# Patient Record
Sex: MALE | Race: WHITE | Employment: UNEMPLOYED | URBAN - METROPOLITAN AREA
[De-identification: names, ages, dates, MRNs, and addresses within clinical notes are randomized per-mention and may not be internally consistent; named-entity substitution may affect disease eponyms.]

---

## 2017-10-09 ENCOUNTER — HOSPITAL ENCOUNTER (INPATIENT)
Age: 62
LOS: 2 days | Discharge: OTHER INSTITUTION | DRG: 189 | End: 2017-10-11
Attending: EMERGENCY MEDICINE | Admitting: HOSPITALIST
Payer: OTHER GOVERNMENT

## 2017-10-09 ENCOUNTER — APPOINTMENT (OUTPATIENT)
Dept: GENERAL RADIOLOGY | Age: 62
DRG: 189 | End: 2017-10-09
Attending: EMERGENCY MEDICINE
Payer: OTHER GOVERNMENT

## 2017-10-09 ENCOUNTER — APPOINTMENT (OUTPATIENT)
Dept: ULTRASOUND IMAGING | Age: 62
DRG: 189 | End: 2017-10-09
Attending: EMERGENCY MEDICINE
Payer: OTHER GOVERNMENT

## 2017-10-09 ENCOUNTER — APPOINTMENT (OUTPATIENT)
Dept: GENERAL RADIOLOGY | Age: 62
DRG: 189 | End: 2017-10-09
Attending: RADIOLOGY
Payer: OTHER GOVERNMENT

## 2017-10-09 DIAGNOSIS — C15.5 MALIGNANT NEOPLASM OF LOWER THIRD OF ESOPHAGUS (HCC): ICD-10-CM

## 2017-10-09 DIAGNOSIS — G89.3 CANCER ASSOCIATED PAIN: ICD-10-CM

## 2017-10-09 DIAGNOSIS — J44.9 COPD (CHRONIC OBSTRUCTIVE PULMONARY DISEASE) WITH CHRONIC BRONCHITIS (HCC): ICD-10-CM

## 2017-10-09 DIAGNOSIS — R06.02 SHORTNESS OF BREATH: ICD-10-CM

## 2017-10-09 PROBLEM — I25.10 CAD (CORONARY ARTERY DISEASE): Status: ACTIVE | Noted: 2017-10-09

## 2017-10-09 PROBLEM — J18.9 HEALTHCARE-ASSOCIATED PNEUMONIA: Status: ACTIVE | Noted: 2017-10-09

## 2017-10-09 PROBLEM — I10 ESSENTIAL HYPERTENSION: Status: ACTIVE | Noted: 2017-10-09

## 2017-10-09 PROBLEM — E11.69 HYPERLIPIDEMIA ASSOCIATED WITH TYPE 2 DIABETES MELLITUS (HCC): Status: ACTIVE | Noted: 2017-10-09

## 2017-10-09 PROBLEM — J18.9 PNEUMONIA: Status: ACTIVE | Noted: 2017-10-09

## 2017-10-09 PROBLEM — E11.59 TYPE 2 DIABETES MELLITUS WITH CIRCULATORY DISORDER (HCC): Status: ACTIVE | Noted: 2017-10-09

## 2017-10-09 PROBLEM — E78.5 HYPERLIPIDEMIA ASSOCIATED WITH TYPE 2 DIABETES MELLITUS (HCC): Status: ACTIVE | Noted: 2017-10-09

## 2017-10-09 PROBLEM — F17.209 TOBACCO USE DISORDER, CONTINUOUS: Status: ACTIVE | Noted: 2017-10-09

## 2017-10-09 LAB
ALBUMIN SERPL-MCNC: 1.4 G/DL (ref 3.4–5)
ALBUMIN/GLOB SERPL: 0.3 {RATIO} (ref 0.8–1.7)
ALP SERPL-CCNC: 79 U/L (ref 45–117)
ALT SERPL-CCNC: 13 U/L (ref 16–61)
ANION GAP SERPL CALC-SCNC: 3 MMOL/L (ref 3–18)
APPEARANCE FLD: ABNORMAL
ARTERIAL PATENCY WRIST A: ABNORMAL
AST SERPL-CCNC: 10 U/L (ref 15–37)
BASE EXCESS BLD CALC-SCNC: 11 MMOL/L
BASOPHILS # BLD: 0 K/UL (ref 0–0.1)
BASOPHILS NFR BLD: 0 % (ref 0–3)
BDY SITE: ABNORMAL
BILIRUB SERPL-MCNC: 0.4 MG/DL (ref 0.2–1)
BNP SERPL-MCNC: 2688 PG/ML (ref 0–900)
BODY TEMPERATURE: 98.8
BUN SERPL-MCNC: 18 MG/DL (ref 7–18)
BUN/CREAT SERPL: 41 (ref 12–20)
CALCIUM SERPL-MCNC: 8.5 MG/DL (ref 8.5–10.1)
CHLORIDE SERPL-SCNC: 95 MMOL/L (ref 100–108)
CO2 SERPL-SCNC: 33 MMOL/L (ref 21–32)
COLOR FLD: YELLOW
CREAT SERPL-MCNC: 0.44 MG/DL (ref 0.6–1.3)
DIFFERENTIAL METHOD BLD: ABNORMAL
EOSINOPHIL # BLD: 0 K/UL (ref 0–0.4)
EOSINOPHIL NFR BLD: 0 % (ref 0–5)
EOSINOPHIL NFR FLD MANUAL: 0 %
ERYTHROCYTE [DISTWIDTH] IN BLOOD BY AUTOMATED COUNT: 16.3 % (ref 11.6–14.5)
GAS FLOW.O2 O2 DELIVERY SYS: ABNORMAL L/MIN
GAS FLOW.O2 SETTING OXYMISER: 15 L/M
GLOBULIN SER CALC-MCNC: 4.8 G/DL (ref 2–4)
GLUCOSE BLD STRIP.AUTO-MCNC: 142 MG/DL (ref 70–110)
GLUCOSE SERPL-MCNC: 167 MG/DL (ref 74–99)
HCO3 BLD-SCNC: 34 MMOL/L (ref 22–26)
HCT VFR BLD AUTO: 23.4 % (ref 36–48)
HGB BLD-MCNC: 7.2 G/DL (ref 13–16)
INR PPP: 1.3 (ref 0.8–1.2)
LACTATE SERPL-SCNC: 1 MMOL/L (ref 0.4–2)
LYMPHOCYTES # BLD: 0.5 K/UL (ref 0.8–3.5)
LYMPHOCYTES NFR BLD: 8 % (ref 20–51)
LYMPHOCYTES NFR FLD: 7 %
MACROPHAGES NFR FLD: 15 %
MCH RBC QN AUTO: 21.8 PG (ref 24–34)
MCHC RBC AUTO-ENTMCNC: 30.8 G/DL (ref 31–37)
MCV RBC AUTO: 70.9 FL (ref 74–97)
MONOCYTES # BLD: 0.8 K/UL (ref 0–1)
MONOCYTES NFR BLD: 12 % (ref 2–9)
MONOCYTES NFR FLD: 5 %
NEUTROPHILS NFR FLD: 73 %
NEUTS BAND # FLD: 0 %
NEUTS BAND NFR BLD MANUAL: 10 % (ref 0–5)
NEUTS SEG # BLD: 4.6 K/UL (ref 1.8–8)
NEUTS SEG NFR BLD: 70 % (ref 42–75)
NUC CELL # FLD: 790 /CU MM
O2/TOTAL GAS SETTING VFR VENT: 1 %
PCO2 BLD: 42 MMHG (ref 35–45)
PH BLD: 7.52 [PH] (ref 7.35–7.45)
PLATELET # BLD AUTO: 399 K/UL (ref 135–420)
PMV BLD AUTO: 8.1 FL (ref 9.2–11.8)
PO2 BLD: 66 MMHG (ref 80–100)
POTASSIUM SERPL-SCNC: 3.5 MMOL/L (ref 3.5–5.5)
PROT SERPL-MCNC: 6.2 G/DL (ref 6.4–8.2)
PROTHROMBIN TIME: 15.8 SEC (ref 11.5–15.2)
RBC # BLD AUTO: 3.3 M/UL (ref 4.7–5.5)
RBC # FLD: 138 /CU MM
RBC MORPH BLD: ABNORMAL
SAO2 % BLD: 94 % (ref 92–97)
SERVICE CMNT-IMP: ABNORMAL
SODIUM SERPL-SCNC: 131 MMOL/L (ref 136–145)
SPECIMEN SOURCE FLD: ABNORMAL
SPECIMEN TYPE: ABNORMAL
TOTAL RESP. RATE, ITRR: 40
TROPONIN I SERPL-MCNC: <0.02 NG/ML (ref 0–0.06)
WBC # BLD AUTO: 6.6 K/UL (ref 4.6–13.2)
WBC MORPH BLD: ABNORMAL

## 2017-10-09 PROCEDURE — 77030016057 HC NDL HUBR APOL -B

## 2017-10-09 PROCEDURE — 84484 ASSAY OF TROPONIN QUANT: CPT | Performed by: EMERGENCY MEDICINE

## 2017-10-09 PROCEDURE — 0W993ZZ DRAINAGE OF RIGHT PLEURAL CAVITY, PERCUTANEOUS APPROACH: ICD-10-PCS | Performed by: RADIOLOGY

## 2017-10-09 PROCEDURE — 99285 EMERGENCY DEPT VISIT HI MDM: CPT

## 2017-10-09 PROCEDURE — 74011250636 HC RX REV CODE- 250/636: Performed by: PHYSICIAN ASSISTANT

## 2017-10-09 PROCEDURE — 65660000000 HC RM CCU STEPDOWN

## 2017-10-09 PROCEDURE — 71010 XR CHEST PORT: CPT

## 2017-10-09 PROCEDURE — 82962 GLUCOSE BLOOD TEST: CPT

## 2017-10-09 PROCEDURE — 96365 THER/PROPH/DIAG IV INF INIT: CPT

## 2017-10-09 PROCEDURE — 74011000258 HC RX REV CODE- 258: Performed by: EMERGENCY MEDICINE

## 2017-10-09 PROCEDURE — 77030014137 US THORACENTESIS RT NDL W IMAGE

## 2017-10-09 PROCEDURE — 89051 BODY FLUID CELL COUNT: CPT | Performed by: EMERGENCY MEDICINE

## 2017-10-09 PROCEDURE — 3E0G76Z INTRODUCTION OF NUTRITIONAL SUBSTANCE INTO UPPER GI, VIA NATURAL OR ARTIFICIAL OPENING: ICD-10-PCS | Performed by: HOSPITALIST

## 2017-10-09 PROCEDURE — 82803 BLOOD GASES ANY COMBINATION: CPT

## 2017-10-09 PROCEDURE — 76450000000

## 2017-10-09 PROCEDURE — 77030013140 HC MSK NEB VYRM -A

## 2017-10-09 PROCEDURE — 74011250636 HC RX REV CODE- 250/636: Performed by: HOSPITALIST

## 2017-10-09 PROCEDURE — 71010 XR CHEST INSP AND EXP: CPT

## 2017-10-09 PROCEDURE — 93005 ELECTROCARDIOGRAM TRACING: CPT

## 2017-10-09 PROCEDURE — 83880 ASSAY OF NATRIURETIC PEPTIDE: CPT | Performed by: EMERGENCY MEDICINE

## 2017-10-09 PROCEDURE — 94640 AIRWAY INHALATION TREATMENT: CPT

## 2017-10-09 PROCEDURE — 74011250636 HC RX REV CODE- 250/636: Performed by: EMERGENCY MEDICINE

## 2017-10-09 PROCEDURE — 83605 ASSAY OF LACTIC ACID: CPT | Performed by: EMERGENCY MEDICINE

## 2017-10-09 PROCEDURE — 36600 WITHDRAWAL OF ARTERIAL BLOOD: CPT

## 2017-10-09 PROCEDURE — 80053 COMPREHEN METABOLIC PANEL: CPT | Performed by: EMERGENCY MEDICINE

## 2017-10-09 PROCEDURE — 96361 HYDRATE IV INFUSION ADD-ON: CPT

## 2017-10-09 PROCEDURE — 85025 COMPLETE CBC W/AUTO DIFF WBC: CPT | Performed by: EMERGENCY MEDICINE

## 2017-10-09 PROCEDURE — 77030020887 HC CRM SKN PROT DIMTH S&N -A

## 2017-10-09 PROCEDURE — 77030011256 HC DRSG MEPILEX <16IN NO BORD MOLN -A

## 2017-10-09 PROCEDURE — 74011000250 HC RX REV CODE- 250: Performed by: EMERGENCY MEDICINE

## 2017-10-09 PROCEDURE — 85610 PROTHROMBIN TIME: CPT | Performed by: EMERGENCY MEDICINE

## 2017-10-09 PROCEDURE — 87040 BLOOD CULTURE FOR BACTERIA: CPT | Performed by: EMERGENCY MEDICINE

## 2017-10-09 RX ORDER — LIDOCAINE HYDROCHLORIDE 10 MG/ML
10 INJECTION, SOLUTION EPIDURAL; INFILTRATION; INTRACAUDAL; PERINEURAL
Status: COMPLETED | OUTPATIENT
Start: 2017-10-09 | End: 2017-10-09

## 2017-10-09 RX ORDER — IPRATROPIUM BROMIDE AND ALBUTEROL SULFATE 2.5; .5 MG/3ML; MG/3ML
3 SOLUTION RESPIRATORY (INHALATION)
Status: DISCONTINUED | OUTPATIENT
Start: 2017-10-09 | End: 2017-10-11 | Stop reason: HOSPADM

## 2017-10-09 RX ORDER — FENTANYL 25 UG/1
1 PATCH TRANSDERMAL
COMMUNITY

## 2017-10-09 RX ORDER — IPRATROPIUM BROMIDE AND ALBUTEROL SULFATE 2.5; .5 MG/3ML; MG/3ML
3 SOLUTION RESPIRATORY (INHALATION)
Status: COMPLETED | OUTPATIENT
Start: 2017-10-09 | End: 2017-10-09

## 2017-10-09 RX ORDER — SODIUM CHLORIDE 0.9 % (FLUSH) 0.9 %
5-10 SYRINGE (ML) INJECTION AS NEEDED
Status: DISCONTINUED | OUTPATIENT
Start: 2017-10-09 | End: 2017-10-11 | Stop reason: HOSPADM

## 2017-10-09 RX ORDER — FUROSEMIDE 10 MG/ML
INJECTION INTRAMUSCULAR; INTRAVENOUS
Status: DISPENSED
Start: 2017-10-09 | End: 2017-10-10

## 2017-10-09 RX ORDER — MORPHINE SULFATE ORAL SOLUTION 10 MG/5ML
4 SOLUTION ORAL
COMMUNITY
End: 2017-10-09

## 2017-10-09 RX ORDER — MORPHINE SULFATE 4 MG/ML
4 INJECTION, SOLUTION INTRAMUSCULAR; INTRAVENOUS ONCE
Status: COMPLETED | OUTPATIENT
Start: 2017-10-09 | End: 2017-10-09

## 2017-10-09 RX ORDER — IPRATROPIUM BROMIDE AND ALBUTEROL SULFATE 2.5; .5 MG/3ML; MG/3ML
3 SOLUTION RESPIRATORY (INHALATION) ONCE
Status: COMPLETED | OUTPATIENT
Start: 2017-10-09 | End: 2017-10-09

## 2017-10-09 RX ORDER — ENOXAPARIN SODIUM 100 MG/ML
40 INJECTION SUBCUTANEOUS EVERY 24 HOURS
Status: DISCONTINUED | OUTPATIENT
Start: 2017-10-09 | End: 2017-10-10

## 2017-10-09 RX ORDER — MORPHINE SULFATE 4 MG/ML
2 INJECTION, SOLUTION INTRAMUSCULAR; INTRAVENOUS
Status: DISCONTINUED | OUTPATIENT
Start: 2017-10-09 | End: 2017-10-10

## 2017-10-09 RX ORDER — FUROSEMIDE 10 MG/ML
40 INJECTION INTRAMUSCULAR; INTRAVENOUS ONCE
Status: COMPLETED | OUTPATIENT
Start: 2017-10-09 | End: 2017-10-09

## 2017-10-09 RX ORDER — SODIUM CHLORIDE 0.9 % (FLUSH) 0.9 %
5-10 SYRINGE (ML) INJECTION AS NEEDED
Status: DISCONTINUED | OUTPATIENT
Start: 2017-10-09 | End: 2017-10-10

## 2017-10-09 RX ORDER — ALBUTEROL SULFATE 90 UG/1
AEROSOL, METERED RESPIRATORY (INHALATION)
COMMUNITY

## 2017-10-09 RX ORDER — MORPHINE SULFATE 20 MG/ML
20 SOLUTION ORAL
COMMUNITY

## 2017-10-09 RX ORDER — ALBUTEROL SULFATE 90 UG/1
1 AEROSOL, METERED RESPIRATORY (INHALATION)
Status: DISCONTINUED | OUTPATIENT
Start: 2017-10-09 | End: 2017-10-11 | Stop reason: HOSPADM

## 2017-10-09 RX ORDER — SODIUM CHLORIDE 0.9 % (FLUSH) 0.9 %
5-10 SYRINGE (ML) INJECTION EVERY 8 HOURS
Status: DISCONTINUED | OUTPATIENT
Start: 2017-10-09 | End: 2017-10-10

## 2017-10-09 RX ORDER — LEVOFLOXACIN 5 MG/ML
750 INJECTION, SOLUTION INTRAVENOUS EVERY 24 HOURS
Status: DISCONTINUED | OUTPATIENT
Start: 2017-10-09 | End: 2017-10-10

## 2017-10-09 RX ADMIN — IPRATROPIUM BROMIDE AND ALBUTEROL SULFATE 3 ML: .5; 3 SOLUTION RESPIRATORY (INHALATION) at 13:54

## 2017-10-09 RX ADMIN — Medication 4 MG: at 17:00

## 2017-10-09 RX ADMIN — IPRATROPIUM BROMIDE AND ALBUTEROL SULFATE 3 ML: .5; 3 SOLUTION RESPIRATORY (INHALATION) at 14:03

## 2017-10-09 RX ADMIN — PIPERACILLIN SODIUM,TAZOBACTAM SODIUM 4.5 G: 4; .5 INJECTION, POWDER, FOR SOLUTION INTRAVENOUS at 21:38

## 2017-10-09 RX ADMIN — LIDOCAINE HYDROCHLORIDE 6 ML: 10 INJECTION, SOLUTION EPIDURAL; INFILTRATION; INTRACAUDAL; PERINEURAL at 15:12

## 2017-10-09 RX ADMIN — SODIUM CHLORIDE 1000 MG: 900 INJECTION, SOLUTION INTRAVENOUS at 18:45

## 2017-10-09 RX ADMIN — SODIUM CHLORIDE 1416 ML: 900 INJECTION, SOLUTION INTRAVENOUS at 13:37

## 2017-10-09 RX ADMIN — IPRATROPIUM BROMIDE AND ALBUTEROL SULFATE 3 ML: .5; 3 SOLUTION RESPIRATORY (INHALATION) at 13:55

## 2017-10-09 RX ADMIN — FUROSEMIDE 40 MG: 10 INJECTION, SOLUTION INTRAVENOUS at 19:46

## 2017-10-09 RX ADMIN — PIPERACILLIN SODIUM,TAZOBACTAM SODIUM 4.5 G: 4; .5 INJECTION, POWDER, FOR SOLUTION INTRAVENOUS at 14:27

## 2017-10-09 RX ADMIN — ENOXAPARIN SODIUM 40 MG: 40 INJECTION SUBCUTANEOUS at 17:41

## 2017-10-09 RX ADMIN — LEVOFLOXACIN 750 MG: 5 INJECTION, SOLUTION INTRAVENOUS at 15:39

## 2017-10-09 RX ADMIN — IPRATROPIUM BROMIDE AND ALBUTEROL SULFATE 3 ML: .5; 3 SOLUTION RESPIRATORY (INHALATION) at 13:26

## 2017-10-09 RX ADMIN — Medication 5 ML: at 16:44

## 2017-10-09 NOTE — ED NOTES
Pt demanding pain medication; RN rec'd order for morphine, had originally planned on holding for floor RN to evaluate pt prior to giving per Patti's request, however pt continues to demand the pain medicine, BP remains 17L systolic, will give dose now with agreement from [facility], [facility].

## 2017-10-09 NOTE — PROGRESS NOTES
Pharmacy Dosing Services: Vancomycin    Consult for Vancomycin Dosing by Pharmacy by Dr. Pepe Malcolm provided for this 62y.o. year old male , for indication of HCAP. Day of Therapy 01    Ht Readings from Last 1 Encounters:   10/09/17 162.6 cm (64\")        Wt Readings from Last 1 Encounters:   10/09/17 47.2 kg (104 lb)        Previous Regimen NA   Last Level NA   Other Current Antibiotics Zosyn 4.5 gm IV q6, Levaquin 750 mg IV q24   Significant Cultures Pending   Serum Creatinine Lab Results   Component Value Date/Time    Creatinine 0.44 10/09/2017 01:00 PM      Creatinine Clearance Estimated Creatinine Clearance: 119.2 mL/min (based on Cr of 0.44). BUN Lab Results   Component Value Date/Time    BUN 18 10/09/2017 01:00 PM      WBC Lab Results   Component Value Date/Time    WBC 6.6 10/09/2017 01:00 PM      H/H Lab Results   Component Value Date/Time    HGB 7.2 10/09/2017 01:00 PM      Platelets Lab Results   Component Value Date/Time    PLATELET 101 83/38/3726 01:00 PM      Temp 98.3 °F (36.8 °C)     Start Vancomycin therapy, with loading dose of 1 gm IV @ 17:00 [id number]  Follow with maintenance dose of 750 mg IV q8h, beginning @ 01:00 [id number]    Dose calculated to approximate a therapeutic trough of 15-20 mcg/mL. Pharmacy to follow daily and will make changes to dose and/or frequency based on clinical status. Nish Zamora Pharm. D.   Clinical Pharmacist  [phone]

## 2017-10-09 NOTE — ROUTINE PROCESS
Left sided thoracentesis performed. 950 cc yellow fluid drained. Patient tolerated procedure well. Patient given to radiology for chest x-ray in stable condition.

## 2017-10-09 NOTE — H&P
History & Physical    Patient: Nano Martin MRN: [id number]  CSN: [id number]    YOB: 1955  Age: 62 y.o. Sex: male      DOA: 10/9/2017  Primary Care Provider:  Karolina Villanueva MD      Assessment/Plan     Patient Active Problem List   Diagnosis Code    Malignant neoplasm of lower third of esophagus [facility])- 2015 C15.5    Type 2 diabetes mellitus with circulatory disorder (HCC) E11.59    CAD (coronary artery disease)- STEMI with 3 bare metal stents 2014 I25.10    Tobacco use disorder, continuous F17.209    Essential hypertension I10    COPD (chronic obstructive pulmonary disease) with chronic bronchitis (HCC) J44.9    Hyperlipidemia associated with type 2 diabetes mellitus (HCC) E11.69, E78.5    Healthcare-associated pneumonia J18.9    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 Possible PNA / SOB  2. COPD  3. Esophageal Cancer  4. HTN  5. DM2 with Circulatory Disorder  6. HLD  7. S/P PEG Tube  8. LE Edema    1. Admit under inpatient status to telemetry unit for further management. Patient on IV abx - Levaquin, Vancocin, and Zosyn. Patient afebrile, however he is tachypneic and tachycardic. qSOFA score of 2 in ED, sepsis bundle initiated in ED. CXR results are as follows:   1. Cardiomegaly with central congestion and nonspecific bilateral  mid to lower   lung coarse and interstitial markings, moderate right and tiny  left-sided effusion. Recommend clinical correlation to exclude  interstitial edema from fluid overload versus CHF.         2. Moderate length segment mid to distal metallic esophageal stent in  Place  Will order sputum culture and await results/sensitivities. Will order albuterol inhaler as patient reports taking it at home, and will add duo-neb prn for patient. Will follow patient closely and monitor vitals. 2. As above, albuterol inhaler ordered, will add duo-nebs prn.  3. Patient on hospice at home? On Fentanyl patch and morphine at home.  Patient reports just placing fentanyl patch yesterday, will not reorder upon admission. 4. No listed meds, patient has been borderline hypotensive upon admission. Receiving IVF in ED. 5. Will place patient on SSI coverage. 7. Patient reports all of his meals through PEG tube. 8. Add lasix 20mg q day    Estimated length of stay : 3-4 midnights    CC: SOB       HPI:     Ave Danielle is a 62 y.o. male who presents to the ED with a complaint of worsening shortness of breath that began a \"few days ago\". He has a significant past medical history of esophageal cancer, pleural effusions, COPD, DM2, HLD, HTN, CAD, and depression. His home meds include only an albuterol inhaler, fentanyl patch, and morphine. He was hospitalized one week ago at the [address] [place] [address] for a pleural effusion. His daughter is the primary source of information during exam, the patient is a poor historian. She reports that the home health nurse had come to the house and was taking vitals when the pulse ox was not reading properly on the patient's hand. The nurse checked to ensure the pulse ox was working properly by placing it on her hand, and it was indeed functional. At that time the home health nurse called EMS to have patient transported to ED. The patient denies any fevers, CP, N/V/D, chills, or night sweats. He only reports shortness of breath and a cough.      Past Medical History:   Diagnosis Date    CAD (coronary artery disease)- STEMI with 3 bare metal stents 2014 10/9/2017    Cancer associated pain 10/9/2017    COPD (chronic obstructive pulmonary disease) with chronic bronchitis ([address] 10/9/2017    Depression     Essential hypertension 10/9/2017    Hyperlipidemia associated with type 2 diabetes mellitus ([address] 10/9/2017    Hypertension     Malignant neoplasm of lower third of esophagus ([address] 2015    Tobacco use disorder, continuous 10/9/2017    Type 2 diabetes mellitus with circulatory disorder ([address] 10/9/2017       Past Surgical History:   Procedure Laterality Date    HX CORONARY STENT PLACEMENT  2014    bare metal x 3    HX GI      PEG tube    HX GI  2017    esophageal stent    HX PREMALIG/BENIGN SKIN LESION EXCISION  2010    pyogenic granuloma scrotum       History reviewed. No pertinent family history. Social History     Social History    Marital status:      Spouse name: N/A    Number of children: N/A    Years of education: N/A     Social History Main Topics    Smoking status: Former Smoker    Smokeless tobacco: Never Used    Alcohol use No    Drug use: None    Sexual activity: Not Asked     Other Topics Concern    None     Social History Narrative    None       Prior to Admission medications    Medication Sig Start Date End Date Taking? Authorizing Provider   fentaNYL (DURAGESIC) 25 mcg/hr PATCH 1 Patch by TransDERmal route every seventy-two (72) hours. Yes Phys MD Kay   morphine (ROXANOL) 100 mg/5 mL (20 mg/mL) concentrated solution Take 20 mg by mouth every two (2) hours as needed for Pain. Yes Historical Provider   albuterol (PROVENTIL HFA, VENTOLIN HFA, PROAIR HFA) 90 mcg/actuation inhaler Take  by inhalation. Phys MD Kay       No Known Allergies    Review of Systems  Gen: No fever, chills, malaise, weight loss/gain. Heent: No headache, rhinorrhea, epistaxis, ear pain, hearing loss, sinus pain, neck pain/stiffness, sore throat. Heart: No chest pain, palpitations, JOSEPH, pnd, or orthopnea. Resp: No hemoptysis. Reports a cough, wheezing, and shortness of breath. GI: No nausea, vomiting, diarrhea, constipation, melena or hematochezia. : No urinary obstruction, dysuria or hematuria. Derm: No rash, new skin lesion or pruritis. Musc/skeletal: no bone or joint complains. Vasc: No cyanosis or claudication. Reports new LE edema  Endo: No heat/cold intolerance, no polyuria,polydipsia or polyphagia. Neuro: No unilateral weakness, numbness, tingling. No seizures. Heme: No easy bruising or bleeding.           Physical Exam:     Physical Exam:  Visit Vitals    BP 94/75    Pulse (!) 106    Temp 98.3 °F (36.8 °C)    Resp (!) 34    Ht 5' 4\" (1.626 m)    Wt 47.2 kg (104 lb)    SpO2 95%    BMI 17.85 kg/m2    O2 Flow Rate (L/min): 3 l/min O2 Device: Nasal cannula    Temp (24hrs), Av.3 °F (36.8 °C), Min:98.3 °F (36.8 °C), Max:98.3 °F (36.8 °C)             General:  Awake, chronically ill appearing, mild distress. Head:  Normocephalic, without obvious abnormality, atraumatic. Eyes:  Conjunctivae/corneas clear, sclera anicteric, PERRL, EOMs intact. Nose: Nares normal. No drainage or sinus tenderness. Throat: Lips, mucosa, and tongue normal.    Neck: Supple, symmetrical, trachea midline, no adenopathy. Lungs:   Expiratory wheezes present, right side greater than left. Heart:  Regular rate and rhythm, S1, S2 normal, no murmur, click, rub or gallop. Abdomen: Soft, non-tender. Bowel sounds normal. No masses,  No organomegaly. Extremities: Extremities normal, atraumatic, no cyanosis. Capillary refill normal. 2+ pitting edema present bilaterally. Pulses: 2+ and symmetric all extremities. Skin: Skin color pale. No rashes or lesions   Neurologic: CNII-XII intact. No focal motor or sensory deficit. Labs Reviewed: All lab results for the last 24 hours reviewed.   Recent Results (from the past 24 hour(s))   EKG, 12 LEAD, INITIAL    Collection Time: 10/09/17 12:51 PM   Result Value Ref Range    Ventricular Rate 98 BPM    Atrial Rate 98 BPM    P-R Interval 132 ms    QRS Duration 76 ms    Q-T Interval 404 ms    QTC Calculation (Bezet) 515 ms    Calculated P Axis 62 degrees    Calculated R Axis -38 degrees    Calculated T Axis 51 degrees    Diagnosis       Normal sinus rhythm  Indeterminate axis  Low voltage QRS  Nonspecific T wave abnormality  Prolonged QT  Abnormal ECG  No previous ECGs available     CBC WITH AUTOMATED DIFF    Collection Time: 10/09/17  1:00 PM   Result Value Ref Range    WBC 6.6 4.6 - 13.2 K/uL    RBC 3.30 (L) 4.70 - 5.50 M/uL    HGB 7.2 (L) 13.0 - 16.0 g/dL    HCT 23.4 (L) 36.0 - 48.0 %    MCV 70.9 (L) 74.0 - 97.0 FL    MCH 21.8 (L) 24.0 - 34.0 PG    MCHC 30.8 (L) 31.0 - 37.0 g/dL    RDW 16.3 (H) 11.6 - 14.5 %    PLATELET 688 781 - 167 K/uL    MPV 8.1 (L) 9.2 - 11.8 FL    NEUTROPHILS 70 42 - 75 %    BAND NEUTROPHILS 10 (H) 0 - 5 %    LYMPHOCYTES 8 (L) 20 - 51 %    MONOCYTES 12 (H) 2 - 9 %    EOSINOPHILS 0 0 - 5 %    BASOPHILS 0 0 - 3 %    ABS. NEUTROPHILS 4.6 1.8 - 8.0 K/UL    ABS. LYMPHOCYTES 0.5 (L) 0.8 - 3.5 K/UL    ABS. MONOCYTES 0.8 0 - 1.0 K/UL    ABS. EOSINOPHILS 0.0 0.0 - 0.4 K/UL    ABS. BASOPHILS 0.0 0.0 - 0.1 K/UL    RBC COMMENTS ANISOCYTOSIS  1+        RBC COMMENTS MICROCYTOSIS  1+        RBC COMMENTS        POLYCHROMASIA  SLIGHT  HYPOCHROMIA  SLIGHT  POIKILOCYTOSIS  1+      RBC COMMENTS TARGET CELLS  OCCASIONAL  OVALOCYTES  FEW        DF MANUAL     TROPONIN I    Collection Time: 10/09/17  1:00 PM   Result Value Ref Range    Troponin-I, Qt. <0.02 0.00 - 0.06 NG/ML   NT-PRO BNP    Collection Time: 10/09/17  1:00 PM   Result Value Ref Range    NT pro-BNP 2688 (H) 0 - 422 PG/ML   METABOLIC PANEL, COMPREHENSIVE    Collection Time: 10/09/17  1:00 PM   Result Value Ref Range    Sodium 131 (L) 136 - 145 mmol/L    Potassium 3.5 3.5 - 5.5 mmol/L    Chloride 95 (L) 100 - 108 mmol/L    CO2 33 (H) 21 - 32 mmol/L    Anion gap 3 3.0 - 18 mmol/L    Glucose 167 (H) 74 - 99 mg/dL    BUN 18 7.0 - 18 MG/DL    Creatinine 0.44 (L) 0.6 - 1.3 MG/DL    BUN/Creatinine ratio 41 (H) 12 - 20      GFR est AA >60 >60 ml/min/1.73m2    GFR est non-AA >60 >60 ml/min/1.73m2    Calcium 8.5 8.5 - 10.1 MG/DL    Bilirubin, total 0.4 0.2 - 1.0 MG/DL    ALT (SGPT) 13 (L) 16 - 61 U/L    AST (SGOT) 10 (L) 15 - 37 U/L    Alk.  phosphatase 79 45 - 117 U/L    Protein, total 6.2 (L) 6.4 - 8.2 g/dL    Albumin 1.4 (L) 3.4 - 5.0 g/dL    Globulin 4.8 (H) 2.0 - 4.0 g/dL    A-G Ratio 0.3 (L) 0.8 - 1.7     LACTIC ACID    Collection Time: 10/09/17  1:00 PM   Result Value Ref Range    Lactic acid 1.0 0.4 - 2.0 MMOL/L   PROTHROMBIN TIME + INR    Collection Time: 10/09/17  1:00 PM   Result Value Ref Range    Prothrombin time 15.8 (H) 11.5 - 15.2 sec    INR 1.3 (H) 0.8 - 1.2         Procedures/imaging: see electronic medical records for all procedures/Xrays and details which were not copied into this note but were reviewed prior to creation of Plan      CC: Karolina Villanueva MD

## 2017-10-09 NOTE — PROGRESS NOTES
Admission Medication Reconciliation has been performed on this ED patient consisting of interview of the patient regarding their PTA Home Medication List, Allergies and PMH as well as obtaining outpatient pharmacy information. Interviewed patient' s daughter who pulled up rx info on [address] website. Pt is barely coherent and a poor historian at this time. [facility]  Smoking status is up to 1 ppd  Alcohol use denies  Illicit drug use denies  Patient ABX use within the past 30 days = Augmentin suspension  Has patient received any antineoplastics in the past 30 days? No, last chemo was in July  Has patient received any radiation treatments in the past 45 days?  na      Medication Reconciliation Interventions:   Wrong Medication Identified 1  Wrong/missing medication strength or dose identified  0  Wrong/missing Interval Identified 0  Wrong/missing Route Identified 0  Medication Duplication 0  Omissions 0  Commissions 0  Other Issue(s) Identified (Indicate): 0            Medication Compliance Issues and/or Medication Concerns: 0    Milton Taveras [id number] Pharmacist  [phone]

## 2017-10-09 NOTE — Clinical Note
Status[de-identified] Inpatient [101] Type of Bed: Telemetry [19] Inpatient Hospitalization Certified Necessary for the Following Reasons: 3. Patient receiving treatment that can only be provided in an inpatient setting (further clarification in H&P documentation) Admitting Diagnosis: Healthcare-associated pneumonia [225048] Admitting Physician: Cain Tolbert [[id number]] Attending Physician: Cain Tolbert [[id number]] Estimated Length of Stay: 2 Midnights Discharge Plan[de-identified] Home with Office Follow-up

## 2017-10-09 NOTE — ED TRIAGE NOTES
Presents today via EMS c/o shortness of breath, worsening over last few days. Hx: esophageal cancer, states was recently admitted to [facility] and had fluid on lungs pulled off about 2 weeks ago. Sepsis Screening completed    (  x)Patient meets SIRS criteria. (  )Patient does not meet SIRS criteria.       SIRS Criteria is achieved when two or more of the following are present   Temperature < 96.8°F (36°C) or > 100.9°F (38.3°C)   Heart Rate > 90 beats per minute   Respiratory Rate > 20 breaths per minute   WBC count > 12,000 or <4,000 or > 10% bands

## 2017-10-09 NOTE — PALLIATIVE CARE
Palliative medicine consult noted and appreciated. PM team members, Rachael Sofia RN and Dr. Jeri Harada met briefly with Mr. Mary Callejas in ED. He was asleep when we entered his room but roused easily. US personnell came to take Mr. Mary Callejas for a procedure so we were unable to do more than introduce ourselves and suggest a meeting for tomorrow when his daughter would be available. Mr. Mary Callejas agreed with this and provided his daughter's name and contact information. Call placed to Blair Holbrook pt's daughter, [phone]. She stated that she and her sister are both Medical Power of  and that she will provide this document as well as Mr. Lawanda Koyanagi Living Will. She states he does NOT have a DDNR but she believes that is consistent with his wishes as stated in his Living Will. Thor Anna was interested in speaking with PM team but wants to verify when her sister may be available as they make all decisions together as a family. Contact information provided. She will call back as soon as she is able to speak with her sister. Full consult note to follow family meeting.      Rachael Sofia RN

## 2017-10-09 NOTE — PROGRESS NOTES

## 2017-10-09 NOTE — ED NOTES
Palliative care down to speak with pt; left card for daughter and will set up to speak with her and pt tomorrow. Pt to IR at this time for US thoracentesis. Remains on O2 @ 3L/min. IV fluids continue to infuse.

## 2017-10-09 NOTE — ROUTINE PROCESS
TRANSFER - IN REPORT:    Verbal report received from GONZALO GRIFFIN [facility]) on Char Laguerre  being received from ED(unit) for routine progression of care      Report consisted of patients Situation, Background, Assessment and   Recommendations(SBAR). Information from the following report(s) SBAR, Kardex, ED Summary, Procedure Summary, Intake/Output, MAR, Recent Results and Cardiac Rhythm ST was reviewed with the receiving nurse. Opportunity for questions and clarification was provided. Assessment completed upon patients arrival to unit and care assumed.

## 2017-10-09 NOTE — ED PROVIDER NOTES
[address]  EMERGENCY DEPARTMENT HISTORY AND PHYSICAL EXAM       Date: 10/9/2017   Patient Name: Raghu Olguin   YOB: 1955  Medical Record Number: [id number]    History of Presenting Illness     Chief Complaint   Patient presents with    Shortness of Breath        History Provided By:  Patient, EMS    Additional History: 12:44 PM  Raghu Olguin is a 62 y.o. male PMHx of COPD who presents to the emergency department via EMS from hospice C/O worsening SOB onset a \"few days ago\". Associated symptoms include bilateral leg swelling. Health care nurse called EMS due to low O2 sats. Multiple episodes of choking. Productive couhg. Recurrent pna for 6 months. Recent inpatient pna treatment. Finished course of abx last week. SOB. Similar SOB in past that improved with fluid removal at [facility].  Worse with exertion. Pmhx of esophageal cancer and pleural effusion. Last hospitalized at the [place] one week ago for pleural effusion. Pt rx Fentanyl patch and Morphine. Pt denies any other Sx or complaints.        Primary Care Provider: Karolina Villanueva MD   Specialist:    Past History     Past Medical History:   Past Medical History:   Diagnosis Date    CAD (coronary artery disease)- STEMI with 3 bare metal stents 2014 10/9/2017    Cancer associated pain 10/9/2017    COPD (chronic obstructive pulmonary disease) with chronic bronchitis ([address] 10/9/2017    Depression     Essential hypertension 10/9/2017    Hyperlipidemia associated with type 2 diabetes mellitus ([address] 10/9/2017    Hypertension     Malignant neoplasm of lower third of esophagus ([address] 2015    Tobacco use disorder, continuous 10/9/2017    Type 2 diabetes mellitus with circulatory disorder ([address] 10/9/2017        Past Surgical History:   Past Surgical History:   Procedure Laterality Date    HX CORONARY STENT PLACEMENT  2014    bare metal x 3    HX GI      PEG tube    HX GI  2017    esophageal stent    HX PREMALIG/BENIGN SKIN LESION EXCISION  2010    pyogenic granuloma scrotum        Family History:   History reviewed. No pertinent family history. Social History:   Social History   Substance Use Topics    Smoking status: Former Smoker    Smokeless tobacco: Never Used    Alcohol use No        Allergies:   No Known Allergies     Review of Systems   Review of Systems   Respiratory: Positive for cough, choking and shortness of breath. Cardiovascular: Negative for leg swelling. Musculoskeletal: Negative for neck pain. Neurological: Negative for headaches. Psychiatric/Behavioral: Negative for confusion. All other systems reviewed and are negative. Physical Exam  Vitals:    10/10/17 0805 10/10/17 0828 10/10/17 0906 10/10/17 1106   BP: (!) 78/47  (!) 68/52 (!) 78/51   Pulse: 86  92 87   Resp: 27  (!) 36 28   Temp: 97.6 °F (36.4 °C)   97.8 °F (36.6 °C)   SpO2: 100% 97%  99%   Weight:       Height:           Physical Exam  Vital signs and nursing notes reviewed    CONSTITUTIONAL: Alert, in no apparent distress; well-developed; muscle wastin  HEAD:  Normocephalic, atraumatic  EYES: PERRL; EOM's intact. ENTM: Nose: no rhinorrhea; Throat: no erythema or exudate, mucous membranes moist  Neck:  No JVD, supple without lymphadenopathy  RESP: Expiratory wheezing, right greater than left   CV: S1 and S2 WNL; No murmurs, gallops or rubs. GI: Normal bowel sounds, abdomen soft and non-tender. No masses or organomegaly. : No costo-vertebral angle tenderness. BACK:  Non-tender  UPPER EXT:  Normal inspection  LOWER EXT: No edema, no calf tenderness. Distal pulses intact. NEURO: CN intact, reflexes 2/4 and sym, strength 5/5 and sym, sensation intact. SKIN: No rashes;   PSYCH:  Alert and oriented, normal affect.       Diagnostic Study Results     Labs -      Recent Results (from the past 12 hour(s))   GLUCOSE, POC    Collection Time: 10/10/17 11:22 AM   Result Value Ref Range    Glucose (POC) 122 (H) 70 - 110 mg/dL       Radiologic Studies -  The following have been ordered and reviewed:  XR CHEST PORT   Final Result   IMPRESSION:     1. Cardiomegaly with central congestion and nonspecific bilateral mid to lower  lung coarse and interstitial markings, moderate right and tiny left-sided  effusion. Recommend clinical correlation to exclude interstitial edema from  fluid overload versus CHF.        2. Moderate length segment mid to distal metallic esophageal stent in place. As read by the radiologist.    Murray     (Results Pending)           Medical Decision Making   I am the first provider for this patient. I reviewed the vital signs, available nursing notes, past medical history, past surgical history, family history and social history. Vital Signs-Reviewed the patient's vital signs. Patient Vitals for the past 12 hrs:   Temp Pulse Resp BP SpO2   10/10/17 1106 97.8 °F (36.6 °C) 87 28 (!) 78/51 99 %   10/10/17 0906 - 92 (!) 36 (!) 68/52 -   10/10/17 0828 - - - - 97 %   10/10/17 0805 97.6 °F (36.4 °C) 86 27 (!) 78/47 100 %   10/10/17 0758 - 92 24 (!) 72/50 -       Pulse Oximetry Analysis - Abnormal 89% on room air     Cardiac Monitor:   Rate: 97 bpm  Rhythm: Normal Sinus Rhythm      EKG interpretation: (Preliminary)  Rhythm: Normal Sinus Rhythm. Non-specific ST changes. No STEMI . Rate (approx.): 98 bpm; CA interval 132 ms, QRS 76 ms, QTC/QTc 515 ms  EKG read by Bert Ta MD at 12:51 PM    Old Medical Records: Old medical records. Previous electrocardiograms. Nursing notes. Provider Notes:     INITIAL CLINICAL IMPRESSION and PLANS:  The patient presents with the primary complaint(s) of: SOB. The presentation, to include historical aspects and clinical findings are consistent with the DX of COPD exacerbation. However, other possible DX's to consider as primary, associated with, or exacerbated by include:    1. CHF  2. PNA   3.   Recurrent plueral effusion       Considering the above, my initial management plan to evaluate and therapeutic interventions include the following and as noted in the orders:    1. Labs: lactic acid, blood culture, CMP, NT-Pro BNP, Troponin I, CBC with DIff, PTT + INR  2. Imaging: CXR, EKG      Procedures:   Procedures    ED Course:  12:44 PM  Initial assessment performed. The patients presenting problems have been discussed, and they are in agreement with the care plan formulated and outlined with them. I have encouraged them to ask questions as they arise throughout their visit. 1:36 PM Discussed patient's history, exam, and available diagnostics results with interventional radiology who will evaluate patient for thoracentesis. 1:50 PM Daughter at bedside now. States that she has an advance directive at home. She will bring it to the hospital. PET scan scheduled on October 24th, 2017. Patient not on home hospice at this time. 2:44 PM Palliative care is in the room. 3:00 PM Discussed patient's history, exam, and available diagnostics results with Jackie Azul MD, internal medicine, who agree to admit patient to telemetry. 3:03 PM  Patient is being admitted to the hospital by Jackie Azul MD. The results of their tests and reasons for their admission have been discussed with them and/or available family. They convey agreement and understanding for the need to be admitted and for their admission diagnosis. CONDITIONS ON ADMISSION:  Deep Vein Thrombosis is not present at the time of admission. Thrombosis is not present at the time of admission. Urinary Tract Infection is not present at the time of admission. Pneumonia is not present at the time of admission. MRSA is not present at the time of admission. Wound infection is not present at the time of admission. Pressure Ulcer is not present at the time of admission. Sepsis bundle initiated covering for hospital acquired pna. Palliative care consult.   Daughter states patient with request for DNR in prior conversations with father. She states she in the medical POA. Mild respiratory distress. Thoracentesis by IR. Muscle wasting. Lactate wnl. IVF given. Daughter comfortable with plan.   Admit to hospital.      Medications Given in the ED:  Medications   sodium chloride (NS) flush 5-10 mL (not administered)   albuterol (PROVENTIL HFA, VENTOLIN HFA, PROAIR HFA) inhaler 1 Puff (not administered)   albuterol-ipratropium (DUO-NEB) 2.5 MG-0.5 MG/3 ML (not administered)   furosemide (LASIX) 10 mg/mL injection (not administered)   morphine IR (MS IR) tablet 15 mg (15 mg Per G Tube Given 10/10/17 1844)   haloperidol (HALDOL) 2 mg/mL oral solution 2 mg (not administered)     Or   haloperidol lactate (HALDOL) injection 2 mg (not administered)   ondansetron (ZOFRAN ODT) tablet 4 mg (not administered)   LORazepam (INTENSOL) 2 mg/mL oral concentrate 1 mg (not administered)   acetaminophen (TYLENOL) tablet 650 mg (not administered)     Or   acetaminophen (TYLENOL) solution 650 mg (not administered)     Or   acetaminophen (TYLENOL) suppository 650 mg (not administered)   albuterol-ipratropium (DUO-NEB) 2.5 MG-0.5 MG/3 ML (3 mL Nebulization Given 10/9/17 1403)   sodium chloride 0.9 % bolus infusion 1,416 mL (0 mL/kg × 47.2 kg IntraVENous IV Completed 10/9/17 1631)   vancomycin (VANCOCIN) 1,000 mg in 0.9% sodium chloride (MBP/ADV) 250 mL adv (1,000 mg IntraVENous New Bag 10/9/17 1845)   albuterol-ipratropium (DUO-NEB) 2.5 MG-0.5 MG/3 ML (3 mL Nebulization Given 10/9/17 1354)   lidocaine (PF) (XYLOCAINE) 10 mg/mL (1 %) injection 10 mL (6 mL SubCUTAneous Given 10/9/17 1512)   morphine injection 4 mg (4 mg IntraVENous Given 10/9/17 1700)   furosemide (LASIX) injection 40 mg (40 mg IntraVENous Given 10/9/17 1946)   sodium chloride 0.9 % bolus infusion 500 mL (500 mL IntraVENous New Bag 10/10/17 5684)   sodium chloride 0.9 % bolus infusion 1,000 mL (1,000 mL IntraVENous New Bag 10/10/17 7519)         Diagnosis Clinical Impression:   1. Shortness of breath    2. Cancer associated pain    3. Malignant neoplasm of lower third of esophagus (HCC)    4. COPD (chronic obstructive pulmonary disease) with chronic bronchitis ([address]    5. Healthcare associated pneumonia  6. Pleural effusion  7. Sepsis   _______________________________   Attestations: This note is prepared by Adelfo Alejo and Masoud Banuelos, acting as a Scribe for Emili Soliz MD on 12:44 PM on 10/9/2017 . Emili Soliz MD: The scribe's documentation has been prepared under my direction and personally reviewed by me in its entirety.   _______________________________

## 2017-10-09 NOTE — ED NOTES
Pt returned from thoracentesis; continues to be tachypneic with shallow respirations. States \"feels a little easier\".

## 2017-10-09 NOTE — ED NOTES
Pt continues to be tachypneic however coarseness has decreased throughout lungs. +diminished throughout R>L.

## 2017-10-09 NOTE — BRIEF OP NOTE
BRIEF OPERATIVE NOTE    Status Post Right sided U/S guided Thoracentesis. Removed 950 cc of clear yellow fluid with specimen submitted to lab per requesting clinician. No immediate complications. To have post procedure CXR.     ..      Karl Mensah MD  Vascular & Interventional Radiology  [facility]  10/9/2017

## 2017-10-09 NOTE — PROGRESS NOTES
0 Pt arrived on the unit via stretcher accompanied by family members. Pt is alert and oriented x4, ST on the monitor, VSS. Pt is tachypneic and dyspneic with exertion; 94% on 3L nc. Lung sounds are coarse with crackles in the lower lobes. Dressing from right-sided thoracentesis is CD&I. Pt has been oriented to the room, call bell has been left within reach, and pt denies any further needs at this time. Assessment performed and charted. 1904 Paged hospitalist to verify rate for tube feedings. 1910 Spoke to Dr. Surya Corral and orders were received to start tube feedings at 10ml/hr and to increase rate by 10ml every hour until goal of 40ml is reached. Orders also received to add 100ml of normal saline flushes q4 hours as well. Orders read back and verified. 1930 Pt was dyspneic at rest using accessory muscles. sp02 80% on 4l nc. Placed pt on non-rebreather mask. O2 saturations increased to 89%. Oncoming nurse paging MD for further orders. Shift Summary- Pt was seen and evaluated by RT and is now 98% on hiflow 02. abg's were drawn, and 40mg of IV lasix given. Pt is resting comfortably in bed. Bedside and Verbal shift change report given to JARRELL Garnett Mai (oncoming nurse) by Jacklyn Atkins   (offgoing nurse).  Report included the following information SBAR, Kardex, ED Summary, Procedure Summary, Intake/Output, MAR, Recent Results and Cardiac Rhythm ST.

## 2017-10-09 NOTE — ED NOTES
Pt requesting pain medication states takes morphine every four hours; has not had any since 0900 today. Hospital PA is at bedside speaking with pt at this time.

## 2017-10-10 PROBLEM — D64.9 ANEMIA: Status: ACTIVE | Noted: 2017-10-10

## 2017-10-10 PROBLEM — E43 PROTEIN-CALORIE MALNUTRITION, SEVERE (HCC): Status: ACTIVE | Noted: 2017-10-10

## 2017-10-10 PROBLEM — J96.00 ACUTE RESPIRATORY FAILURE (HCC): Status: ACTIVE | Noted: 2017-10-10

## 2017-10-10 PROBLEM — I95.9 HYPOTENSION: Status: ACTIVE | Noted: 2017-10-10

## 2017-10-10 LAB
ANION GAP SERPL CALC-SCNC: 4 MMOL/L (ref 3–18)
BASOPHILS # BLD: 0 K/UL (ref 0–0.1)
BASOPHILS NFR BLD: 0 % (ref 0–3)
BUN SERPL-MCNC: 15 MG/DL (ref 7–18)
BUN/CREAT SERPL: 22 (ref 12–20)
CALCIUM SERPL-MCNC: 7.8 MG/DL (ref 8.5–10.1)
CHLORIDE SERPL-SCNC: 100 MMOL/L (ref 100–108)
CO2 SERPL-SCNC: 31 MMOL/L (ref 21–32)
CREAT SERPL-MCNC: 0.67 MG/DL (ref 0.6–1.3)
DIFFERENTIAL METHOD BLD: ABNORMAL
EOSINOPHIL # BLD: 0 K/UL (ref 0–0.4)
EOSINOPHIL NFR BLD: 0 % (ref 0–5)
ERYTHROCYTE [DISTWIDTH] IN BLOOD BY AUTOMATED COUNT: 16.3 % (ref 11.6–14.5)
EST. AVERAGE GLUCOSE BLD GHB EST-MCNC: 200 MG/DL
GLUCOSE BLD STRIP.AUTO-MCNC: 122 MG/DL (ref 70–110)
GLUCOSE BLD STRIP.AUTO-MCNC: 132 MG/DL (ref 70–110)
GLUCOSE SERPL-MCNC: 110 MG/DL (ref 74–99)
HBA1C MFR BLD: 8.6 % (ref 4.5–5.6)
HCT VFR BLD AUTO: 21.8 % (ref 36–48)
HGB BLD-MCNC: 6.8 G/DL (ref 13–16)
LYMPHOCYTES # BLD: 0.2 K/UL (ref 0.8–3.5)
LYMPHOCYTES NFR BLD: 3 % (ref 20–51)
MCH RBC QN AUTO: 22.1 PG (ref 24–34)
MCHC RBC AUTO-ENTMCNC: 31.2 G/DL (ref 31–37)
MCV RBC AUTO: 71 FL (ref 74–97)
MONOCYTES # BLD: 0.2 K/UL (ref 0–1)
MONOCYTES NFR BLD: 3 % (ref 2–9)
NEUTS BAND NFR BLD MANUAL: 31 % (ref 0–5)
NEUTS SEG # BLD: 3.5 K/UL (ref 1.8–8)
NEUTS SEG NFR BLD: 63 % (ref 42–75)
PLATELET # BLD AUTO: 323 K/UL (ref 135–420)
PMV BLD AUTO: 8.1 FL (ref 9.2–11.8)
POTASSIUM SERPL-SCNC: 3.3 MMOL/L (ref 3.5–5.5)
RBC # BLD AUTO: 3.07 M/UL (ref 4.7–5.5)
RBC MORPH BLD: ABNORMAL
SODIUM SERPL-SCNC: 135 MMOL/L (ref 136–145)
WBC # BLD AUTO: 5.5 K/UL (ref 4.6–13.2)

## 2017-10-10 PROCEDURE — 74011250636 HC RX REV CODE- 250/636: Performed by: EMERGENCY MEDICINE

## 2017-10-10 PROCEDURE — 36415 COLL VENOUS BLD VENIPUNCTURE: CPT | Performed by: HOSPITALIST

## 2017-10-10 PROCEDURE — 82962 GLUCOSE BLOOD TEST: CPT

## 2017-10-10 PROCEDURE — 83036 HEMOGLOBIN GLYCOSYLATED A1C: CPT | Performed by: HOSPITALIST

## 2017-10-10 PROCEDURE — 74011000258 HC RX REV CODE- 258: Performed by: EMERGENCY MEDICINE

## 2017-10-10 PROCEDURE — 86920 COMPATIBILITY TEST SPIN: CPT | Performed by: HOSPITALIST

## 2017-10-10 PROCEDURE — 80048 BASIC METABOLIC PNL TOTAL CA: CPT | Performed by: PHYSICIAN ASSISTANT

## 2017-10-10 PROCEDURE — 74011250637 HC RX REV CODE- 250/637: Performed by: FAMILY MEDICINE

## 2017-10-10 PROCEDURE — 65660000000 HC RM CCU STEPDOWN

## 2017-10-10 PROCEDURE — 74011250636 HC RX REV CODE- 250/636: Performed by: HOSPITALIST

## 2017-10-10 PROCEDURE — 86900 BLOOD TYPING SEROLOGIC ABO: CPT | Performed by: HOSPITALIST

## 2017-10-10 PROCEDURE — 85025 COMPLETE CBC W/AUTO DIFF WBC: CPT | Performed by: PHYSICIAN ASSISTANT

## 2017-10-10 PROCEDURE — 77010033711 HC HIGH FLOW OXYGEN

## 2017-10-10 RX ORDER — INSULIN LISPRO 100 [IU]/ML
INJECTION, SOLUTION INTRAVENOUS; SUBCUTANEOUS
Status: DISCONTINUED | OUTPATIENT
Start: 2017-10-10 | End: 2017-10-10

## 2017-10-10 RX ORDER — ONDANSETRON 4 MG/1
4 TABLET, ORALLY DISINTEGRATING ORAL
Status: DISCONTINUED | OUTPATIENT
Start: 2017-10-10 | End: 2017-10-11 | Stop reason: HOSPADM

## 2017-10-10 RX ORDER — LORAZEPAM 2 MG/ML
1 CONCENTRATE ORAL
Status: DISCONTINUED | OUTPATIENT
Start: 2017-10-10 | End: 2017-10-11 | Stop reason: HOSPADM

## 2017-10-10 RX ORDER — MORPHINE SULFATE 15 MG/1
15 TABLET ORAL
Status: DISCONTINUED | OUTPATIENT
Start: 2017-10-10 | End: 2017-10-11

## 2017-10-10 RX ORDER — DEXTROSE 50 % IN WATER (D50W) INTRAVENOUS SYRINGE
25-50 AS NEEDED
Status: DISCONTINUED | OUTPATIENT
Start: 2017-10-10 | End: 2017-10-10

## 2017-10-10 RX ORDER — ACETAMINOPHEN 650 MG/1
650 SUPPOSITORY RECTAL
Status: DISCONTINUED | OUTPATIENT
Start: 2017-10-10 | End: 2017-10-11 | Stop reason: HOSPADM

## 2017-10-10 RX ORDER — HALOPERIDOL 5 MG/ML
2 INJECTION INTRAMUSCULAR
Status: DISCONTINUED | OUTPATIENT
Start: 2017-10-10 | End: 2017-10-11 | Stop reason: HOSPADM

## 2017-10-10 RX ORDER — MORPHINE SULFATE 15 MG/1
15 TABLET ORAL
Status: DISCONTINUED | OUTPATIENT
Start: 2017-10-10 | End: 2017-10-10

## 2017-10-10 RX ORDER — SODIUM CHLORIDE 9 MG/ML
250 INJECTION, SOLUTION INTRAVENOUS AS NEEDED
Status: DISCONTINUED | OUTPATIENT
Start: 2017-10-10 | End: 2017-10-11 | Stop reason: HOSPADM

## 2017-10-10 RX ORDER — ACETAMINOPHEN 325 MG/1
650 TABLET ORAL
Status: DISCONTINUED | OUTPATIENT
Start: 2017-10-10 | End: 2017-10-11 | Stop reason: HOSPADM

## 2017-10-10 RX ORDER — HALOPERIDOL 2 MG/ML
2 SOLUTION ORAL
Status: DISCONTINUED | OUTPATIENT
Start: 2017-10-10 | End: 2017-10-11 | Stop reason: HOSPADM

## 2017-10-10 RX ORDER — MAGNESIUM SULFATE 100 %
4 CRYSTALS MISCELLANEOUS AS NEEDED
Status: DISCONTINUED | OUTPATIENT
Start: 2017-10-10 | End: 2017-10-10

## 2017-10-10 RX ADMIN — PIPERACILLIN SODIUM,TAZOBACTAM SODIUM 4.5 G: 4; .5 INJECTION, POWDER, FOR SOLUTION INTRAVENOUS at 06:53

## 2017-10-10 RX ADMIN — PIPERACILLIN SODIUM,TAZOBACTAM SODIUM 4.5 G: 4; .5 INJECTION, POWDER, FOR SOLUTION INTRAVENOUS at 02:38

## 2017-10-10 RX ADMIN — MORPHINE SULFATE 15 MG: 15 TABLET ORAL at 15:36

## 2017-10-10 RX ADMIN — MORPHINE SULFATE 15 MG: 15 TABLET ORAL at 21:29

## 2017-10-10 RX ADMIN — MORPHINE SULFATE 15 MG: 15 TABLET ORAL at 18:44

## 2017-10-10 RX ADMIN — MORPHINE SULFATE 15 MG: 15 TABLET ORAL at 12:39

## 2017-10-10 RX ADMIN — SODIUM CHLORIDE 1000 ML: 900 INJECTION, SOLUTION INTRAVENOUS at 10:23

## 2017-10-10 RX ADMIN — MORPHINE SULFATE 15 MG: 15 TABLET ORAL at 23:20

## 2017-10-10 RX ADMIN — MORPHINE SULFATE 15 MG: 15 TABLET ORAL at 10:24

## 2017-10-10 RX ADMIN — MORPHINE SULFATE 15 MG: 15 TABLET ORAL at 13:59

## 2017-10-10 RX ADMIN — SODIUM CHLORIDE 500 ML: 900 INJECTION, SOLUTION INTRAVENOUS at 08:28

## 2017-10-10 RX ADMIN — SODIUM CHLORIDE 750 MG: 900 INJECTION, SOLUTION INTRAVENOUS at 03:19

## 2017-10-10 RX ADMIN — MORPHINE SULFATE 15 MG: 15 TABLET ORAL at 20:13

## 2017-10-10 NOTE — ROUTINE PROCESS
9321:  Paging Dr. Julia Li. Patient's blood pressure 68/52 after 500 ml sodium chloride bolus. Patient states he wants to be a DNR and wants his pain medication. RR was called, Dr. Castillo Hooks to bedside. No new orders. Patient is alert and asymptomatic. ICU RN Luiza Bourgeois at bedside. Patient does have [id number] at bedside that does state patient does not want any life saving measures.

## 2017-10-10 NOTE — PROGRESS NOTES
8278  RRT called    0920 RRT called for low BP. Per [facility] RN pt received bolus of fluid. BP remain SBP 68/52, resp 32 on hi-flow HR 92, Dr Karyn Schirmer and Mark at bedside. Pt sating I want pain medication and to be a DNR. Dr Karyn Schirmer verified this with the patient. Informed to call family and Dr Hubert Flowers to discuss code status. Mgon showed documentation from [facility], designating pt wishes and daughter as decision  Maker. Faye Peters placed call to Dr Hubert Flowers. 7956  Reviewed pallaitive note, called team to notify of paperwork in had.

## 2017-10-10 NOTE — PROGRESS NOTES
Speech Therapy Screening:  Services are not indicated at this time. An InBasket screening referral was triggered for speech therapy based on results obtained during the nursing admission assessment. The patients chart was reviewed and the patient is not appropriate for a skilled therapy evaluation at this time due to: long-term use of TF for nutrition and meds per CATALINO Jamison 2/2 esophageal CA. Hospice following the patient. However, please consult speech therapy if any therapy needs arise. Thank you.     MARCOS MartinS.Devon., CCC/SLP  Speech Language Pathologist

## 2017-10-10 NOTE — PROGRESS NOTES
Readmission Risk Assessment:     Moderate Risk and MSSP/Good Help ACO patients    RRAT Score:  13-20    Initial Assessment: presents to the emergency department via EMS from hospice C/O worsening SOB onset a \"few days ago      Emergency Contact:  See face sheet    Pertinent Medical Hx:    COPD, esophageal Cancer, CAD, HTN,      [facility]      Community Services:       DME:          Moderate Risk Care Transition Plan:  1. Evaluate for [facility] or [facility], SNF, acute rehab, community care coordination of resources. 2. Involve patient/caregiver in assessment, planning, education and implement of intervention. 3. CM daily patient care huddles/interdisciplinary rounds. 4. PCP/Specialist appointment within 5  7 days made prior to discharge. 5. Facilitate transportation and logistics for follow-up appointments. 6. Medication reconciliation [id number]  7. Formal handoff between hospital provider and post-acute provider to transition patient  Handoff to [facility] Nurse Navigator or PCP practice. Chart Reviewed. Noted patient admitted to the hospital for further medical management. Care Management to follow up with patient and/or family for transition of care needs prn.

## 2017-10-10 NOTE — CDMP QUERY
Please clarify if this patient is being treated/managed for:    => Cardiogenic, Septic, or Hypovolemic shock as evidenced by severe hypotension requiring fluid boluses  =>Other Explanation of clinical findings  =>Unable to Determine (no explanation of clinical findings)    The medical record reflects the following:    Risk: Esophageal CA, COPD, HTN, PNA    Clinical Indicators Treatment: Patient presented w/ worsening SOB. He had a fentanyl patch in place for esophageal ca pain. His BP on arrival was 85/48, P99, MAP 57-60. WBC's 6.6 70% neuts, 10% bands. He was given a fluid bolus in ER 1500cc and BP improved to 93/63. The patient was restarted on tube feeds. His BP again dropped overnight to 80's/40's. Pain medication was held due to hypotension. He was given NS bolus 500cc, however BP continued to drop. H/H on admission = 7.2/23.4, dropped to 6.8/21.8 on 10/10. BP currently 68/52, MAP 57 after 500cc bolus. He is also being treated w/ IV vanco, zosyn, and levaquin. Awaiting Palliative care family meeting for further treatment goals. Please clarify and document your clinical opinion in the progress notes and discharge summary including the definitive and/or presumptive diagnosis, (suspected or probable), related to the above clinical findings. Please include clinical findings supporting your diagnosis. If you DECLINE this query or would like to communicate with [facility], please utilize the \"[employer] message box\" at the TOP of the Progress Note on the right.       Thank you,  Marni Pump [facility], [address] [place] [address] Ne

## 2017-10-10 NOTE — PROGRESS NOTES
D/c plan not finalized at this time    Met with patient and palliative care team and IDR team. Was informed patient would like to be on comfort care. However, if he is stable [facility]. was informed

## 2017-10-10 NOTE — ROUTINE PROCESS
26:  Assumed care for patient, received bedside report from Mary Hernandez RN. Patient stated that he did not feel good and \"want pain medicine. \"  Manual vitals taken, BP 72/50 on left arm, P92, R24.      0800:  Dr Marcell Reddy paged in regard to BP.    2671:  Provider assignment updated, Dr. Stephen Stephenson paged in regards to BP.    0816:  Spoke to Dr Stephen Stephenson and ching order of 500ml bolus with readback.     1856:  Shift summary:  See above note

## 2017-10-10 NOTE — CDMP QUERY
Please clarify if this patient is being treated/managed for:    => Acute Hypoxic Respiratory Failure requiring Hi flow O2  =>Other Explanation of clinical findings  =>Unable to Determine (no explanation of clinical findings)    The medical record reflects the following:    Risk: Esophageal CA, HTN, COPD    Clinical Indicators: Patient presented w/ worsening SOB. Had thoracentesis 2 weeks ago. CXR showed moderate R sided effusion. RR 30-40, O2 sat 87% on RA. Placed on NRB mask and transitioned to Hi Flow O2. ABG's performed on 15L NRB mask:  7.517/42/66/34.0/94%    Treatment: Thoracentesis w/ 950cc clear yellow fluid obtained. NRB mask --> Hi Flow O2 @ 35L/min    Please clarify and document your clinical opinion in the progress notes and discharge summary including the definitive and/or presumptive diagnosis, (suspected or probable), related to the above clinical findings. Please include clinical findings supporting your diagnosis. If you DECLINE this query or would like to communicate with [facility], please utilize the \"[facility] message box\" at the TOP of the Progress Note on the right.       Thank you,  Alistair Lockett [address] [place] [address], [address] [place] [address] Ne

## 2017-10-10 NOTE — PROGRESS NOTES
NUTRITION update    ASSESSMENT/PLAN:     Current Diet: NPO    Chart reviewed, note change in goals of care now focused on comfort measures only. Aggressive nutrition intervention is not indicated at this time. Will assist as needed; please consult if nutrition intervention is medically indicated and consistent with patient's goals of care.       Prabhu Aguilera RD  Pager:  [phone]

## 2017-10-10 NOTE — CONSULTS
[facility]: [phone] ([phone])  [facility]: [phone]   [facility]: [phone]    Patient Name: Roseann Bond  YOB: 1955    Date of Initial Consult: 10/10/17  Reason for Consult: symptom mgmt, care decisions  Requesting Provider: Dr. Dorenda Harada   Primary Care Physician: Karolina Villanueva MD      SUMMARY:   Roseann Bond is a 62y.o. year old with a past history of esophageal cancer diagnosed 2014 s/p xrt, stenting, and multiple chemo cycles(last Feb 2017) who presented to ED w/ SOB. Recently admitted at [facility] with R pleural effusion, underwent thoracentesis. Daughter states they were not sure source of effusion, but have been told in past he doesn't have metastatic disease. Was scheduled for repeat PET 10/24/17. Has been G tube dependent for nutrition for a couple of years, but daughter relates progressive weight loss and wasting due to his inability to tolerate feedings. They had discussed enrollment in [place] hosp[facility] at last discharge, but he had wanted to speak with his oncologist about upcoming scans first. He has an AMD designating his daughters as MPOAs and he states unequivocally that he does not want CPR. He is in a lot of pain in his chest even with his fentanyl patch and staff have been reluctant to give him breakthrough pain med due to hypotension and his fluctuating respiratory status. Had 900cc drawn off w/ thoracentesis on admission yesterday.      PALLIATIVE DIAGNOSES:     Patient Active Problem List   Diagnosis Code    Malignant neoplasm of lower third of esophagus ([address]- 2015 C15.5    Type 2 diabetes mellitus with circulatory disorder (HCC) E11.59    CAD (coronary artery disease)- STEMI with 3 bare metal stents 2014 I25.10    Tobacco use disorder, continuous F17.209    Essential hypertension I10    COPD (chronic obstructive pulmonary disease) with chronic bronchitis (HCC) J44.9    Hyperlipidemia associated with type 2 diabetes mellitus (HCC) E11.69, E78.5    Healthcare-associated pneumonia J18.9    Cancer associated pain G89.3    Pneumonia J18.9     1. PLAN:   1. Met with patient after a difficult night of pain, hypotension and dyspnea. He states he knows there is no further treatment that will improve his condition or make him live longer. He says his daughter was working on hospice enrollment at the [place] and that he would like to begin now to focus his care on treatment of his symptoms and stop aggressive care. He would like to transfer to the [place] hospice unit if this proves to be possible, understands that we might not be able to do this if his condition deteriorates rapidly. Spoke to his daughter Ashajeff Hu who confirmed they had discussed this course and she agrees that he would not be able to tolerate or benefit from further treatment. She also supports his decision to change care goal to comfort. Per his wishes, will begin comfort measure and care management will look into transfer to [facility]. Discussed w/ nursing, Dr. Robby Ann, and care management. 2. Initial consult note routed to primary continuity provider  3. Communicated plan of care with: Palliative IDT       GOALS OF CARE:   comfort    Advance Care Planning 10/10/2017   Patient's Healthcare Decision Maker is: Named in scanned ACP document   Primary Decision Maker Name Sanjay Phillips   Primary Decision Maker Phone Number [phone]   Primary Decision Maker Relationship to Patient Adult child   Secondary Decision Maker Name Lionel Collins   Secondary Decision Maker Phone Number [phone]   Secondary Decision Maker Relationship to Patient Adult child   Confirm Advance Directive Yes, on file   Does the patient have other document types Do Not Resuscitate; Other (comment)           HISTORY:     History obtained from: patient, family    CHIEF COMPLAINT: see summary    HPI/SUBJECTIVE:    The patient is:   [x] Verbal and participatory  [] Non-participatory due to: Clinical Pain Assessment (nonverbal scale for nonverbal patients): Pain: 7         FUNCTIONAL ASSESSMENT:     Palliative Performance Scale (PPS):  PPS: 30       PSYCHOSOCIAL/SPIRITUAL SCREENING:     Advance Care Planning:  Advance Care Planning 10/10/2017   Patient's Healthcare Decision Maker is: Named in scanned ACP document   Primary Decision Maker Name Uma Yin   Primary Decision Maker Phone Number [phone]   Primary Decision Maker Relationship to Patient Adult child   Secondary Decision Maker Name Michelle Hernandez   Secondary Decision Maker Phone Number [phone]   Secondary Decision Maker Relationship to Patient Adult child   Confirm Advance Directive Yes, on file   Does the patient have other document types Do Not Resuscitate; Other (comment)        Any spiritual / Synagogue concerns:  [] Yes /  [x] No    Caregiver Burnout:  [] Yes /  [x] No /  [] No Caregiver Present      Anticipatory grief assessment:   [x] Normal  / [] Maladaptive       ESAS Anxiety: Anxiety: 3    ESAS Depression: Depression: 0        REVIEW OF SYSTEMS:     Positive and pertinent negative findings in ROS are noted above in HPI. The following systems were [x] reviewed / [] unable to be reviewed as noted in HPI  Other findings are noted below. Systems: constitutional, ears/nose/mouth/throat, respiratory, gastrointestinal, genitourinary, musculoskeletal, integumentary, neurologic, psychiatric, endocrine. Positive findings noted below. Modified ESAS Completed by: provider   Fatigue: 8 Drowsiness: 5   Depression: 0 Pain: 7   Anxiety: 3 Nausea: 0   Anorexia: 7 Dyspnea: 7     Constipation: No     Stool Occurrence(s): 0        PHYSICAL EXAM:     Wt Readings from Last 3 Encounters:   10/10/17 48.9 kg (107 lb 12.9 oz)     Blood pressure (!) 78/51, pulse 87, temperature 97.8 °F (36.6 °C), resp. rate 28, height 5' 4\" (1.626 m), weight 48.9 kg (107 lb 12.9 oz), SpO2 99 %.   Pain:  Pain Scale 1: Numeric (0 - 10)  Pain Intensity 1: 0 Pain Location 1: Chest  Pain Orientation 1: Right  Pain Description 1: Heaviness  Pain Intervention(s) 1: Declines  Last bowel movement:     Constitutional: cachectic, slightly dyspneic  Eyes: pupils equal, anicteric  ENMT: no nasal discharge, moist mucous membranes  Cardiovascular: regular rhythm, distal pulses intact  Respiratory: breathing not labored, symmetric  Gastrointestinal: soft non-tender, +bowel sounds  Musculoskeletal: no deformity, no tenderness to palpation  Skin: warm, dry  Neurologic: following commands, moving all extremities  Psychiatric: full affect, no hallucinations  Other:       HISTORY:     Active Problems:    Malignant neoplasm of lower third of esophagus ([address]- 2015 (10/9/2017)      Type 2 diabetes mellitus with circulatory disorder ([address] (10/9/2017)      CAD (coronary artery disease)- STEMI with 3 bare metal stents 2014 (10/9/2017)      Tobacco use disorder, continuous (10/9/2017)      Essential hypertension (10/9/2017)      COPD (chronic obstructive pulmonary disease) with chronic bronchitis ([address] (10/9/2017)      Hyperlipidemia associated with type 2 diabetes mellitus ([address] (10/9/2017)      Healthcare-associated pneumonia (10/9/2017)      Cancer associated pain (10/9/2017)      Pneumonia (10/9/2017)      Past Medical History:   Diagnosis Date    CAD (coronary artery disease)- STEMI with 3 bare metal stents 2014 10/9/2017    Cancer associated pain 10/9/2017    COPD (chronic obstructive pulmonary disease) with chronic bronchitis ([address] 10/9/2017    Depression     Essential hypertension 10/9/2017    Hyperlipidemia associated with type 2 diabetes mellitus ([address] 10/9/2017    Hypertension     Malignant neoplasm of lower third of esophagus ([address] 2015    Tobacco use disorder, continuous 10/9/2017    Type 2 diabetes mellitus with circulatory disorder ([address] 10/9/2017      Past Surgical History:   Procedure Laterality Date    HX CORONARY STENT PLACEMENT  2014    bare metal x 3    HX GI PEG tube    HX GI  2017    esophageal stent    HX PREMALIG/BENIGN SKIN LESION EXCISION  2010    pyogenic granuloma scrotum      History reviewed. No pertinent family history. History reviewed, no pertinent family history. Social History   Substance Use Topics    Smoking status: Former Smoker    Smokeless tobacco: Never Used    Alcohol use No     No Known Allergies   Current Facility-Administered Medications   Medication Dose Route Frequency    morphine IR (MS IR) tablet 15 mg  15 mg Per G Tube Q1H PRN    haloperidol (HALDOL) 2 mg/mL oral solution 2 mg  2 mg Per G Tube Q6H PRN    Or    haloperidol lactate (HALDOL) injection 2 mg  2 mg IntraVENous Q6H PRN    ondansetron (ZOFRAN ODT) tablet 4 mg  4 mg Oral Q6H PRN    LORazepam (INTENSOL) 2 mg/mL oral concentrate 1 mg  1 mg Per G Tube Q1H PRN    acetaminophen (TYLENOL) tablet 650 mg  650 mg Per G Tube Q4H PRN    Or    acetaminophen (TYLENOL) solution 650 mg  650 mg Per G Tube Q4H PRN    Or    acetaminophen (TYLENOL) suppository 650 mg  650 mg Rectal Q4H PRN    sodium chloride (NS) flush 5-10 mL  5-10 mL IntraVENous PRN    albuterol (PROVENTIL HFA, VENTOLIN HFA, PROAIR HFA) inhaler 1 Puff  1 Puff Inhalation Q4H PRN    albuterol-ipratropium (DUO-NEB) 2.5 MG-0.5 MG/3 ML  3 mL Nebulization Q4H PRN        LAB AND IMAGING FINDINGS:     Lab Results   Component Value Date/Time    WBC 5.5 10/10/2017 04:00 AM    HGB 6.8 10/10/2017 04:00 AM    PLATELET 781 68/13/2105 04:00 AM     Lab Results   Component Value Date/Time    Sodium 135 10/10/2017 04:00 AM    Potassium 3.3 10/10/2017 04:00 AM    Chloride 100 10/10/2017 04:00 AM    CO2 31 10/10/2017 04:00 AM    BUN 15 10/10/2017 04:00 AM    Creatinine 0.67 10/10/2017 04:00 AM    Calcium 7.8 10/10/2017 04:00 AM      Lab Results   Component Value Date/Time    AST (SGOT) 10 10/09/2017 01:00 PM    Alk.  phosphatase 79 10/09/2017 01:00 PM    Protein, total 6.2 10/09/2017 01:00 PM    Albumin 1.4 10/09/2017 01:00 PM Globulin 4.8 10/09/2017 01:00 PM     Lab Results   Component Value Date/Time    INR 1.3 10/09/2017 01:00 PM    Prothrombin time 15.8 10/09/2017 01:00 PM      No results found for: IRON, FE, TIBC, IBCT, PSAT, FERR   No results found for: PH, PCO2, PO2  No components found for: [place]   Lab Results   Component Value Date/Time     05/06/2014 10:10 AM    CK - MB 8.1 05/06/2014 10:10 AM              Total time: 70 min  Counseling / coordination time, spent as noted above: 55 min  > 50% counseling / coordination      Epifanio Hong MD  Palliative Medicine

## 2017-10-10 NOTE — DIABETES MGMT
GLYCEMIC CONTROL PROGRESS NOTE:    -noted pt on comfort measures  -will comply with pt and family wishes, will continue to communicate with medical team  -no GC recommendations at this time    Bety Bansal RN, MS  Glycemic Control Team

## 2017-10-10 NOTE — ROUTINE PROCESS
2392:  Paging Dr. Wendie Child. Patient's blood pressure 68/52 after 500 ml sodium chloride bolus. Patient states he wants to be a DNR and wants his pain medication. RR was called at 0918, Dr. Williams Villagomez to bedside. No new orders. Patient is alert and asymptomatic. ICU RN Luiza Fisher at bedside. Patient does have [id number] and Living Will at bedside that does state patient does not want any life saving measures. 8326:  Dr. Wendie Child updated. Verbal telephone orders for 1L sodium chloride bolus with readback. Dr. Wendie Child made aware of [id number] for [facility] and Living Will at bedside. Palliative care paged by Moi Carmona RN. Patient to remain full code via Dr. Wendie Child. 0940:  Called daughterJeremiah and left message with call back number. 1000:  Dr. Hansel Logan at bedside speaking to patient about low blood pressures, code status, and pain medication. Patient states he does NOT wish to be a full code and asked about getting something for pain. Dr. Hansel Logan and Remedios Mari RN told patient they have been trying to get a hold of daughters concerning low blood pressures and pain mediations. Dr. Hansel Logan to page Dr. Wendie Child. 1L bolus haning. Patient states \"I just want something for pain and I just want to sleep\". 1010: Monitor Tech made aware of patient's new code status DNR. 1018:  Remedios Mari RN calling daughters and update on patient's status. No contact made and messages left.

## 2017-10-10 NOTE — PROGRESS NOTES
RT called to assess pt for decreased sats and increased work of breathing. ABG drawn and values are within acceptable range. Pt placed on high flow n/c at 90% and 35 lpm. Pt appears to have some relief at this time  RN notified. Will continue to monitor.

## 2017-10-10 NOTE — ROUTINE PROCESS
1920:  Bedside and Verbal shift change report given to Rebecca Dodge RN (oncoming nurse) by Wilver Nelson RN   (offgoing nurse). Report included the following information SBAR, Kardex, Intake/Output and MAR.

## 2017-10-10 NOTE — PROGRESS NOTES
visited with the family of Yvette Herrera, who is a 62 y.o.,male. The  provided the following Interventions:  Initiated a relationship of care and support. Offered  assurance of prayer on patient's behalf. Patient is Palliative Care, and family is trying to arrange for patient to return to the [facility].  provided Brochure and devotional material for patient. Plan:  Chaplains will continue to follow and will provide pastoral care on an as needed/requested basis.  recommends bedside caregivers page  on duty if patient shows signs of acute spiritual or emotional distress. Rev.  [address]  [phone]

## 2017-10-10 NOTE — ACP (ADVANCE CARE PLANNING)
Advance Directive now in chart. Primary MPOA is daughter, Aurora Fothergill [phone]. Secondary MPOA is daughter, Rosmery Peñaloza [phone]. Patient signed DDNR today. He would like comfort care initiated here at [facility] with transfer to [facility].

## 2017-10-10 NOTE — PROGRESS NOTES
Kori 1521 care from [facility], RN. Pt in respiratory distress, Respiratory therapy called and ordered were received to draw ABG, place on high flow O2. Oxygen sats are now 98% on 90% and 35L. Pt requesting morphine but will wait to make sure BP remains stable because he received 40 mg Lasix. Fentanyl patch was found on back of the neck, present on arrival. Will continue to monitor    2340- Pt requesting morphine for the last 3 hours, spoke with hospitalist and replied to hold morphine until BP is up. Currently 81/47. MD did not give orders for low BP. Also, patient has fentanyl patch on, to the back of his neck. 0100- Initiated tube feeding, was not able to start until now because of patient condition, MD aware. 3707- Placed call to MD, informed her that BP is remaining in the 80's/40's. Manual taken at the time was 80/47. Responded that she wanted to wait to see what Palliative wanted to do today when they come see him. 0535- BP is 80/45 manually, pt is arousable but states that he feels very bad and wants his pain medication, I educated him once again that his BP is too low so I can not give his narcotics that he has ordered PRN. 0740- Shift summary- Pt was hypotensive all night, I talked to the hospitalist on the phone once and in person at the nurses station once about the patients condition. No orders were received for the low BP, just to hold the narcotics. Bedside shift change report given to Hardik Monet RN (oncoming nurse) by Erich Mix. Jason Jacobo (offgoing nurse). Report included the following information SBAR and Kardex.

## 2017-10-10 NOTE — PROGRESS NOTES
Cm informed by FELA Coronado unit CM that patient would like to transfer to [facility] palliative care unit,writer also spoke with patients daughter/KEITH Cunningham for verification,cm called Yovana Molina  [phone] left cm contact number waiting on return call,[facility] [place] form signed and faxed .

## 2017-10-11 VITALS
HEIGHT: 64 IN | HEART RATE: 100 BPM | RESPIRATION RATE: 26 BRPM | OXYGEN SATURATION: 93 % | BODY MASS INDEX: 19.16 KG/M2 | SYSTOLIC BLOOD PRESSURE: 90 MMHG | WEIGHT: 112.21 LBS | DIASTOLIC BLOOD PRESSURE: 58 MMHG | TEMPERATURE: 97.6 F

## 2017-10-11 PROCEDURE — 77010033711 HC HIGH FLOW OXYGEN

## 2017-10-11 PROCEDURE — 77030009834 HC MSK O2 TRACH VYRM -A

## 2017-10-11 PROCEDURE — 36430 TRANSFUSION BLD/BLD COMPNT: CPT

## 2017-10-11 PROCEDURE — P9016 RBC LEUKOCYTES REDUCED: HCPCS | Performed by: HOSPITALIST

## 2017-10-11 PROCEDURE — 74011250637 HC RX REV CODE- 250/637: Performed by: FAMILY MEDICINE

## 2017-10-11 PROCEDURE — 30233N1 TRANSFUSION OF NONAUTOLOGOUS RED BLOOD CELLS INTO PERIPHERAL VEIN, PERCUTANEOUS APPROACH: ICD-10-PCS | Performed by: HOSPITALIST

## 2017-10-11 RX ORDER — MORPHINE SULFATE 15 MG/1
30 TABLET ORAL
Status: DISCONTINUED | OUTPATIENT
Start: 2017-10-11 | End: 2017-10-11 | Stop reason: HOSPADM

## 2017-10-11 RX ADMIN — MORPHINE SULFATE 30 MG: 15 TABLET ORAL at 09:15

## 2017-10-11 RX ADMIN — MORPHINE SULFATE 15 MG: 15 TABLET ORAL at 02:37

## 2017-10-11 RX ADMIN — MORPHINE SULFATE 15 MG: 15 TABLET ORAL at 01:18

## 2017-10-11 NOTE — PROGRESS NOTES
Palliative Medicine Progress Note  DR. GUPTA'[facility]: [phone] ([phone]  [facility]: [phone]   [facility]: [phone]    Patient Name: Billy York  YOB: 1955    Date of Initial Consult: 10/10/17  Reason for Consult: symptom mgmt, care decisions  Requesting Provider: Dr. Sharif Giron   Primary Care Physician: Karolina Villanueva MD      SUMMARY:   Billy York is a 62y.o. year old with a past history of esophageal cancer diagnosed 2014 s/p xrt, stenting, and multiple chemo cycles(last Feb 2017) who presented to ED w/ SOB. Recently admitted at [facility] with R pleural effusion, underwent thoracentesis. Daughter states they were not sure source of effusion, but have been told in past he doesn't have metastatic disease. Was scheduled for repeat PET 10/24/17. Has been G tube dependent for nutrition for a couple of years, but daughter relates progressive weight loss and wasting due to his inability to tolerate feedings. They had discussed enrollment in [facility] hospice at last discharge, but he had wanted to speak with his oncologist about upcoming scans first. He has an AMD designating his daughters as MPOAs and he states unequivocally that he does not want CPR. He is in a lot of pain in his chest even with his fentanyl patch and staff have been reluctant to give him breakthrough pain med due to hypotension and his fluctuating respiratory status. Had 900cc drawn off w/ thoracentesis on admission yesterday. 10/11/17: Multiple doses of morphine in evening, but none since 0230. Ready for a dose now, but appears much more comfortable. Denies SOB. By report has been accepted to [facility] hospice unit.      PALLIATIVE DIAGNOSES:     Patient Active Problem List   Diagnosis Code    Malignant neoplasm of lower third of esophagus ([facility]- 2015 C15.5    Type 2 diabetes mellitus with circulatory disorder (HCC) E11.59    CAD (coronary artery disease)- STEMI with 3 bare metal stents 2014 I25.10    Tobacco use disorder, continuous F17.209    Essential hypertension I10    COPD (chronic obstructive pulmonary disease) with chronic bronchitis (HCC) J44.9    Hyperlipidemia associated with type 2 diabetes mellitus (HCC) E11.69, E78.5    Cancer associated pain G89.3    Pneumonia J18.9    Protein-calorie malnutrition, severe (HCC) E43    Acute respiratory failure (HCC) J96.00    Hypotension I95.9    Anemia D64.9     1. PLAN:   1. Met with patient after a difficult night of pain, hypotension and dyspnea. He states he knows there is no further treatment that will improve his condition or make him live longer. He says his daughter was working on hospice enrollment at the [address] [place] [address] and that he would like to begin now to focus his care on treatment of his symptoms and stop aggressive care. He would like to transfer to the [address] [place] [address] unit if this proves to be possible, understands that we might not be able to do this if his condition deteriorates rapidly. Spoke to his daughter DEAN DUBOSE [facility] who confirmed they had discussed this course and she agrees that he would not be able to tolerate or benefit from further treatment. She also supports his decision to change care goal to comfort. Per his wishes, will begin comfort measure and care management will look into transfer to [address] [place] [address]. Discussed w/ nursing, Dr. Lexie Baker, and care management. 10/11/17: Breakthrough dose of morphine upped to 30 mg. For transfer to [address] [place] [address]. DDNR in place. 2. Initial consult note routed to primary continuity provider  3.  Communicated plan of care with: Palliative IDT       GOALS OF CARE:   comfort    Advance Care Planning 10/10/2017   Patient's Healthcare Decision Maker is: Named in scanned ACP document   Primary Decision Maker Name Susan Greene   Primary Decision Maker Phone Number [phone]   Primary Decision Maker Relationship to Patient Adult child   Secondary Decision Maker Name Serena Samayoa   Secondary Decision Maker Phone Number [phone]   Secondary Decision Maker Relationship to Patient Adult child   Confirm Advance Directive Yes, on file   Does the patient have other document types Do Not Resuscitate; Other (comment)           HISTORY:     History obtained from: patient, family    CHIEF COMPLAINT: see summary    HPI/SUBJECTIVE:    The patient is:   [x] Verbal and participatory  [] Non-participatory due to:        Clinical Pain Assessment (nonverbal scale for nonverbal patients): Pain: 7         FUNCTIONAL ASSESSMENT:     Palliative Performance Scale (PPS):  PPS: 30       PSYCHOSOCIAL/SPIRITUAL SCREENING:     Advance Care Planning:  Advance Care Planning 10/10/2017   Patient's Healthcare Decision Maker is: Named in scanned ACP document   Primary Decision Maker Name Shanice Rowell   Primary Decision Maker Phone Number [phone]   Primary Decision Maker Relationship to Patient Adult child   Secondary Decision Maker Name Jaci Cisneros   Secondary Decision Maker Phone Number [phone]   Secondary Decision Maker Relationship to Patient Adult child   Confirm Advance Directive Yes, on file   Does the patient have other document types Do Not Resuscitate; Other (comment)        Any spiritual / [employer]ism concerns:  [] Yes /  [x] No    Caregiver Burnout:  [] Yes /  [x] No /  [] No Caregiver Present      Anticipatory grief assessment:   [x] Normal  / [] Maladaptive       ESAS Anxiety: Anxiety: 3    ESAS Depression: Depression: 0        REVIEW OF SYSTEMS:     Positive and pertinent negative findings in ROS are noted above in HPI. The following systems were [x] reviewed / [] unable to be reviewed as noted in HPI  Other findings are noted below. Systems: constitutional, ears/nose/mouth/throat, respiratory, gastrointestinal, genitourinary, musculoskeletal, integumentary, neurologic, psychiatric, endocrine. Positive findings noted below.   Modified ESAS Completed by: provider   Fatigue: 8 Drowsiness: 5   Depression: 0 Pain: 7   Anxiety: 3 Nausea: 0   Anorexia: 7 Dyspnea: 7     Constipation: No     Stool Occurrence(s): 0        PHYSICAL EXAM:     Wt Readings from Last 3 Encounters:   10/11/17 50.9 kg (112 lb 3.4 oz)     Blood pressure 111/66, pulse 97, temperature 99.1 °F (37.3 °C), resp. rate 22, height 5' 4\" (1.626 m), weight 50.9 kg (112 lb 3.4 oz), SpO2 99 %.   Pain:  Pain Scale 1: Numeric (0 - 10)  Pain Intensity 1: 10  Pain Onset 1: chronic  Pain Location 1: Generalized  Pain Orientation 1:  (generalized)  Pain Description 1: Aching  Pain Intervention(s) 1: Medication (see MAR)  Last bowel movement:     Constitutional: cachectic, appears more comfortable  Eyes: pupils equal, anicteric  ENMT: no nasal discharge, moist mucous membranes  Cardiovascular: regular rhythm, distal pulses intact  Respiratory: breathing not labored, symmetric  Gastrointestinal: soft non-tender, +bowel sounds  Musculoskeletal: no deformity, no tenderness to palpation  Skin: warm, dry  Neurologic: following commands, moving all extremities  Psychiatric: full affect, no hallucinations  Other:       HISTORY:     Principal Problem:    Pneumonia (10/9/2017)    Active Problems:    Malignant neoplasm of lower third of esophagus ([address]- 2015 (10/9/2017)      Type 2 diabetes mellitus with circulatory disorder ([address] (10/9/2017)      CAD (coronary artery disease)- STEMI with 3 bare metal stents 2014 (10/9/2017)      Tobacco use disorder, continuous (10/9/2017)      Essential hypertension (10/9/2017)      COPD (chronic obstructive pulmonary disease) with chronic bronchitis ([address] (10/9/2017)      Hyperlipidemia associated with type 2 diabetes mellitus ([address] (10/9/2017)      Cancer associated pain (10/9/2017)      Protein-calorie malnutrition, severe ([address] (10/10/2017)      Acute respiratory failure ([address] (10/10/2017)      Hypotension (10/10/2017)      Anemia (10/10/2017)      Past Medical History:   Diagnosis Date    CAD (coronary artery disease)- STEMI with 3 bare metal stents 2014 10/9/2017    Cancer associated pain 10/9/2017    [facility]ch[facility] 10/9/2017    Depression     Essential hypertension 10/9/2017    Hyperlipidemia associated with type 2 diabetes mellitus ([facility] 10/9/2017    Hypertension     Malignant neoplasm of lower third of esophagus [facility] 2015    Tobacco use disorder, continuous 10/9/2017    Type 2 diabetes mellitus with circulatory disorder ([facility] 10/9/2017      Past Surgical History:   Procedure Laterality Date    HX CORONARY STENT PLACEMENT  2014    bare metal x 3    HX GI      PEG tube    HX GI  2017    esophageal stent    HX PREMALIG/BENIGN SKIN LESION EXCISION  2010    pyogenic granuloma scrotum      History reviewed. No pertinent family history. History reviewed, no pertinent family history.   Social History   Substance Use Topics    Smoking status: Former Smoker    Smokeless tobacco: Never Used    Alcohol use No     No Known Allergies   Current Facility-Administered Medications   Medication Dose Route Frequency    morphine IR (MS IR) tablet 30 mg  30 mg Per G Tube Q1H PRN    haloperidol (HALDOL) 2 mg/mL oral solution 2 mg  2 mg Per G Tube Q6H PRN    Or    haloperidol lactate (HALDOL) injection 2 mg  2 mg IntraVENous Q6H PRN    ondansetron (ZOFRAN ODT) tablet 4 mg  4 mg Oral Q6H PRN    LORazepam (INTENSOL) 2 mg/mL oral concentrate 1 mg  1 mg Per G Tube Q1H PRN    acetaminophen (TYLENOL) tablet 650 mg  650 mg Per G Tube Q4H PRN    Or    acetaminophen (TYLENOL) solution 650 mg  650 mg Per G Tube Q4H PRN    Or    acetaminophen (TYLENOL) suppository 650 mg  650 mg Rectal Q4H PRN    0.9% sodium chloride infusion 250 mL  250 mL IntraVENous PRN    sodium chloride (NS) flush 5-10 mL  5-10 mL IntraVENous PRN    albuterol (PROVENTIL HFA, VENTOLIN HFA, PROAIR HFA) inhaler 1 Puff  1 Puff Inhalation Q4H PRN    albuterol-ipratropium (DUO-NEB) 2.5 MG-0.5 MG/3 ML  3 mL Nebulization Q4H PRN        LAB AND IMAGING FINDINGS:     Lab Results   Component Value Date/Time    WBC 5.5 10/10/2017 04:00 AM    HGB 6.8 10/10/2017 04:00 AM    PLATELET 664 61/44/9903 04:00 AM     Lab Results   Component Value Date/Time    Sodium 135 10/10/2017 04:00 AM    Potassium 3.3 10/10/2017 04:00 AM    Chloride 100 10/10/2017 04:00 AM    CO2 31 10/10/2017 04:00 AM    BUN 15 10/10/2017 04:00 AM    Creatinine 0.67 10/10/2017 04:00 AM    Calcium 7.8 10/10/2017 04:00 AM      Lab Results   Component Value Date/Time    AST (SGOT) 10 10/09/2017 01:00 PM    Alk.  phosphatase 79 10/09/2017 01:00 PM    Protein, total 6.2 10/09/2017 01:00 PM    Albumin 1.4 10/09/2017 01:00 PM    Globulin 4.8 10/09/2017 01:00 PM     Lab Results   Component Value Date/Time    INR 1.3 10/09/2017 01:00 PM    Prothrombin time 15.8 10/09/2017 01:00 PM      No results found for: IRON, FE, TIBC, IBCT, PSAT, FERR   No results found for: PH, PCO2, PO2  No components found for: [place]   Lab Results   Component Value Date/Time     05/06/2014 10:10 AM    CK - MB 8.1 05/06/2014 10:10 AM              Total time: 15 min  Counseling / coordination time, spent as noted above: 13 min  > 50% counseling / coordination      Yenni Alexander MD  Palliative Medicine

## 2017-10-11 NOTE — DISCHARGE SUMMARY
Discharge Summary    Patient: Leticia Parkinson MRN: [id number]  CSN: [id number]    YOB: 1955  Age: 62 y.o.   Sex: male    DOA: 10/9/2017 LOS:  LOS: 2 days   Discharge Date:      Primary Care Provider:  Karolina Villanueva MD    Admission Diagnoses: Healthcare-associated pneumonia  Pneumonia    Discharge Diagnoses:    Problem List as of 10/11/2017  Never Reviewed          Codes Class Noted - Resolved    Protein-calorie malnutrition, severe ([facility] ICD-10-CM: E43  ICD-9-CM: 262  10/10/2017 - Present        Acute respiratory failure ([facility] ICD-10-CM: J96.00  ICD-9-CM: 518.81  10/10/2017 - Present        Hypotension ICD-10-CM: I95.9  ICD-9-CM: 458.9  10/10/2017 - Present        Anemia ICD-10-CM: D64.9  ICD-9-CM: 285.9  10/10/2017 - Present        Malignant neoplasm of lower third of esophagus ([facility]- 2015 ICD-10-CM: C15.5  ICD-9-CM: 150.5  10/9/2017 - Present        Type 2 diabetes mellitus with circulatory disorder ([facility] ICD-10-CM: E11.59  ICD-9-CM: 250.70  10/9/2017 - Present        CAD (coronary artery disease)- STEMI with 3 bare metal stents 2014 ICD-10-CM: I25.10  ICD-9-CM: 414.00  10/9/2017 - Present        Tobacco use disorder, continuous ICD-10-CM: F17.209  ICD-9-CM: 305.1  10/9/2017 - Present        Essential hypertension ICD-10-CM: I10  ICD-9-CM: 401.9  10/9/2017 - Present        COPD (chronic obstructive pulmonary disease) with chronic bronchitis ([facility] ICD-10-CM: J44.9  ICD-9-CM: 491.20  10/9/2017 - Present        Hyperlipidemia associated with type 2 diabetes mellitus ([facility] ICD-10-CM: E11.69, E78.5  ICD-9-CM: 250.80, 272.4  10/9/2017 - Present        Cancer associated pain ICD-10-CM: G89.3  ICD-9-CM: 338.3  10/9/2017 - Present        * (Principal)Pneumonia ICD-10-CM: J18.9  ICD-9-CM: 802  10/9/2017 - Present              Discharge Medications:     Current Discharge Medication List      CONTINUE these medications which have NOT CHANGED    Details   albuterol (PROVENTIL HFA, VENTOLIN HFA, PROAIR HFA) 90 mcg/actuation inhaler Take  by inhalation. fentaNYL (DURAGESIC) 25 mcg/hr PATCH 1 Patch by TransDERmal route every seventy-two (72) hours. morphine (ROXANOL) 100 mg/5 mL (20 mg/mL) concentrated solution Take 20 mg by mouth every two (2) hours as needed for Pain. Discharge Condition: Poor      Consults: palliative care, Dr. Mar Torres /66    Pulse 97    Temp 99.1 °F (37.3 °C)    Resp 22    Ht 5' 4\" (1.626 m)    Wt 50.9 kg (112 lb 3.4 oz)    SpO2 99%    BMI 19.26 kg/m2     General: Awake, cooperative, on high flow oxygen    HEENT: NC, Atraumatic. PERRLA, EOMI. Anicteric sclerae. Lungs:  Coarse breath sounds Bilaterally. Ethelene Gauss Heart:  Regular  rhythm,  No murmur, No Rubs, No Gallops  Abdomen: Soft, Non distended, Non tender. +Bowel sounds, PEG in place   Extremities: No c/c/e  Psych:   Not anxious or agitated. Neurologic:  No acute neurological deficits. Admission HPI :   Barb Wells is a 62 y.o. male who presents to the ED with a complaint of worsening shortness of breath that began a \"few days ago\". He has a significant past medical history of stage 4 esophageal cancer, pleural effusions, COPD, DM2, HLD, HTN, CAD, and depression. His home meds include only an albuterol inhaler, fentanyl patch, and morphine. He was hospitalized one week ago at the [place] for a pleural effusion. His daughter is the primary source of information during exam, the patient is a poor historian. She reports that the home health nurse had come to the house and was taking vitals when the pulse ox was not reading properly on the patient's hand. The nurse checked to ensure the pulse ox was working properly by placing it on her hand, and it was indeed functional. At that time the home health nurse called EMS to have patient transported to ED. The patient denies any fevers, CP, N/V/D, chills, or night sweats.  He only reports shortness of breath and a cough. Addendum - patient was diagnosed with esophageal ca 2 years ago. He is stage 4. He is s/p multiple chemo and radiation. Was told few weeks ago that he is not a candidate for further chemo or treatment and recommended hospice.       Hospital Course :   Patient admitted for acute respiratory failure - which is multifactorial with history of COPD, pleural effusion. He has required high flow oxygen. He was initially started on broad spectrum antibiotics for possible pneumonia, however he did not had any fever, his WBC in normal range. His CXR showed Cardiomegaly with central congestion and nonspecific bilateral mid to lower lung coarse and interstitial markings, moderate right and tiny left-sided effusion. Recommend clinical correlation to exclude interstitial edema from fluid overload versus CHF. Wanted to give him lasix however his BP was on lower side. He required small fluid boluses. Patient reported that his BP is always low. His H/H low and he received blood transfusion, follow up H/H pending. His BP improved after blood transfusion. This writer and palliative care physician Dr. Kojo Santana had long discussion with patient and daughters. Patient does not want any aggressive treatment. He wants to be DNR and wants to go to [place] under hospice. His prognosis is very poor, we do not have his previous records and records requested from [facility]. Patient has been accepted at [facility] at their palliative care unit. He will be transferred there. Activity: Activity as tolerated    Diet: tube feeding.     Follow-up: palliative care unit attending Dr. Brandon Mathis    Disposition: palliative care unit at [facility] 145 spent on discharge: 55       Labs: Results:       Chemistry Recent Labs      10/10/17   0400  10/09/17   1300   GLU  110*  167*   NA  135*  131*   K  3.3*  3.5   CL  100  95*   CO2  31  33*   BUN  15  18   CREA  0.67  0.44*   CA  7.8*  8.5   AGAP  4 3   BUCR  22*  41*   AP   --   79   TP   --   6.2*   ALB   --   1.4*   GLOB   --   4.8*   AGRAT   --   0.3*      CBC w/Diff Recent Labs      10/10/17   0400  10/09/17   1300   WBC  5.5  6.6   RBC  3.07*  3.30*   HGB  6.8*  7.2*   HCT  21.8*  23.4*   PLT  323  399   GRANS  63  70   LYMPH  3*  8*   EOS  0  0      Cardiac Enzymes No results for input(s): CPK, CKND1, ETIENNE in the last 72 hours. No lab exists for component: CKRMB, TROIP   Coagulation Recent Labs      10/09/17   1300   PTP  15.8*   INR  1.3*       Lipid Panel Lab Results   Component Value Date/Time    Cholesterol, total 138 05/05/2014 02:33 AM    HDL Cholesterol 40 05/05/2014 02:33 AM    LDL, calculated 86 05/05/2014 02:33 AM    Triglyceride 60 05/05/2014 02:33 AM    CHOL/HDL Ratio 3.5 05/05/2014 02:33 AM      BNP No results for input(s): BNPP in the last 72 hours. Liver Enzymes Recent Labs      10/09/17   1300   TP  6.2*   ALB  1.4*   AP  79   SGOT  10*      Thyroid Studies Lab Results   Component Value Date/Time    TSH 1.140 05/04/2014 06:14 PM            Significant Diagnostic Studies: Xr Chest Port    Result Date: 10/9/2017  EXAM:Chest X-Ray  History: Shortness of breath, history of esophageal cancer Technique:  Portable Frontal View Comparison: none _______________ FINDINGS: Moderate length mid to distal esophageal metallic stent. A right-sided Mediport is present with the catheter tip projecting over the right heart/right atrium. Midline mediastinum. Midline enlarged cardiac silhouette with central vascular congestion. Moderate hazy coarsened bilateral perihilar and left lower lung coarse reticular markings with minimal patchiness at the left diaphragmatic surface and a tiny left effusion. Moderate layering right-sided pleural effusion. No pneumothorax Intact osseous structures. A percutaneous gastrostomy catheter is present in the left upper quadrant. _______________     IMPRESSION: 1.  Cardiomegaly with central congestion and nonspecific bilateral mid to lower lung coarse and interstitial markings, moderate right and tiny left-sided effusion. Recommend clinical correlation to exclude interstitial edema from fluid overload versus CHF. 2. Moderate length segment mid to distal metallic esophageal stent in place. Xr Chest Insp And Exp    Result Date: 10/9/2017  EXAM:Chest X-Ray  History: Status post right-sided thoracentesis Technique:  Frontal inspiration and expiration views of the chest Comparison: Chest x-ray performed earlier same day _______________ FINDINGS: No postprocedural pneumothorax. Confluent right infrahilar to basilar opacity, representing persistent atelectasis, and a tiny amount of effusion. No significant change in the diffuse coarsened interstitial pattern in both upper and lower lungs. Mid to distal thoracic esophageal metallic stent and right-sided Mediport are stable. Minimal blunting of left costophrenic angle with slight patchy reticular opacities in the left lower lung. Stable osseous structures. _______________     IMPRESSION: 1. No postprocedural pneumothorax. Right lower lung opacity, favoring airspace disease/atelectasis and likely tiny residual effusion. Us Thoracentesis Rt Ndl W Image    Result Date: 10/9/2017  Ultrasound Guided Thoracentesis Indication: Right-sided pleural effusion, hypoxia requiring oxygenation, history of esophageal carcinoma. Procedure: GUIDANCE: Ultrasound guidance was used to position (and confirm the position of) the needle / catheter set. Image(s) saved in PACS: Ultrasound The risks, benefits, and alternatives to the procedure were explained to the patient prior to beginning the procedure. The patient gave both oral and written consent and the consent form was signed and placed on the chart before the procedure. Standard departmental preprocedure timeout was called.  Using standard sterile technique, an Arrow thoracentesis catheter was used to drain the right pleural space under ultrasound guidance. Approximately 950 cc of clear yellow fluid was aspirated from the pleural cavity. The catheter was then removed and the site dressed with a sterile bandage. A specimen was submitted per requesting physician. The patient tolerated the procedure well and there were no immediate post procedure complications. A follow-up chest x-ray to exclude pneumothorax is pending. IMPRESSION: 1. Uncomplicated ultrasound guided right-sided thoracentesis. No results found for this or any previous visit.         CC: Karolina Villanueva MD

## 2017-10-11 NOTE — PROGRESS NOTES
Hospitalist Progress Note    Patient: Teri Hernandez MRN: [id number]  CSN: [id number]    YOB: 1955  Age: 62 y.o. Sex: male    DOA: 10/9/2017 LOS:  LOS: 1 day                Assessment/Plan     Patient Active Problem List   Diagnosis Code    Malignant neoplasm of lower third of esophagus ([facility] [address]- 2015 C15.5    Type 2 diabetes mellitus with circulatory disorder (HCC) E11.59    CAD (coronary artery disease)- STEMI with 3 bare metal stents 2014 I25.10    Tobacco use disorder, continuous F17.209    Essential hypertension I10    COPD (chronic obstructive pulmonary disease) with chronic bronchitis (HCC) J44.9    Hyperlipidemia associated with type 2 diabetes mellitus (HCC) E11.69, E78.5    Cancer associated pain G89.3    Pneumonia J18.9    Protein-calorie malnutrition, severe ([facility] [address] E43    Acute respiratory failure (HCC) J96.00    Hypotension I95.9    Anemia D64.9            63 yo male with history of stage 4 esophageal ca, s/p radiation and chemo. Discussed at length with patient's daughter. Explained patient's terminal condition. She reports that patient was diagnosed with esophageal ca 2 years ago. He is stage 4. He is s/p multiple chemo and radiation. Was told few weeks ago that he is not a candidate for further chemo or treatment and recommended hospice. Patient brought with respiratory distress. His care is at [facility]. Was recently hospitalized for pleural effusion. Home health nurse noticed low oxygen saturations. Acute respiratory failure - likely due to pulmonary edema. On high flow oxygen. Possible pneumonia - continue on antibiotics. Hypotension - unlikely septic. His blood pressure is likely on the lower side due to his low muscle mass. He is asymptomatic. He has received fluid boluses. PEG tube feeding. Complains of pain all over. Discussed with patient's daughter about poor prognosis and treatment of pain and hypotension.  Daughter wants pain control and wants patient to be comfortable. Seen by palliative care. Patient signed DNR. He wants to [facility] to [facility] under hospice. Requesting transfer. Prognosis poor, condition critical.     [facility]    Physical Exam:  General: Awake, chronic ill appearing, cachectic.     HEENT: NC, Atraumatic. PERRLA, anicteric sclerae. Lungs: Coarse breath sounds Bilaterally. No Wheezing/Rhonchi/Rales. Heart:  Regular  rhythm,  No murmur, No Rubs, No Gallops  Abdomen: Soft, Non distended, Non tender.  +Bowel sounds, PEG in place. Extremities: No c/c/e             Vital signs/Intake and Output:  Visit Vitals    BP (!) 78/51 (BP 1 Location: Left arm, BP Patient Position: At rest)    Pulse 87    Temp 97.8 °F (36.6 °C)    Resp 28    Ht 5' 4\" (1.626 m)    Wt 48.9 kg (107 lb 12.9 oz)    SpO2 99%    BMI 18.5 kg/m2     Current Shift:     Last three shifts:  10/09 0701 - 10/10 1900  In: 890 [I.V.:450]  Out: 1400 [Urine:1400]            Labs: Results:       Chemistry Recent Labs      10/10/17   0400  10/09/17   1300   GLU  110*  167*   NA  135*  131*   K  3.3*  3.5   CL  100  95*   CO2  31  33*   BUN  15  18   CREA  0.67  0.44*   CA  7.8*  8.5   AGAP  4  3   BUCR  22*  41*   AP   --   79   TP   --   6.2*   ALB   --   1.4*   GLOB   --   4.8*   AGRAT   --   0.3*      CBC w/Diff Recent Labs      10/10/17   0400  10/09/17   1300   WBC  5.5  6.6   RBC  3.07*  3.30*   HGB  6.8*  7.2*   HCT  21.8*  23.4*   PLT  323  399   GRANS  63  70   LYMPH  3*  8*   EOS  0  0      Cardiac Enzymes No results for input(s): CPK, CKND1, ETIENNE in the last 72 hours.     No lab exists for component: CKRMB, TROIP   Coagulation Recent Labs      10/09/17   1300   PTP  15.8*   INR  1.3*       Lipid Panel Lab Results   Component Value Date/Time    Cholesterol, total 138 05/05/2014 02:33 AM    HDL Cholesterol 40 05/05/2014 02:33 AM    LDL, calculated 86 05/05/2014 02:33 AM    Triglyceride 60 05/05/2014 02:33 AM    CHOL/HDL Ratio 3.5 05/05/2014 02:33 AM      BNP No results for input(s): BNPP in the last 72 hours.    Liver Enzymes Recent Labs      10/09/17   1300   TP  6.2*   ALB  1.4*   AP  79   SGOT  10*      Thyroid Studies Lab Results   Component Value Date/Time    TSH 1.140 05/04/2014 06:14 PM        Procedures/imaging: see electronic medical records for all procedures/Xrays and details which were not copied into this note but were reviewed prior to creation of Plan

## 2017-10-11 NOTE — ROUTINE PROCESS
Bedside and Verbal shift change report given to Fani Bullard  (oncoming nurse) by Estelle Brunner, RN  (offgoing nurse). Report given with SBAR, Kardex, Intake/Output and Recent Results.

## 2017-10-11 NOTE — PROGRESS NOTES
3028 Assumed care of pt from [place] [address]. Pt resting quietly in bed, sleeping , no signs of distress, call bell within reach. [address] with Ramón Morley from the [place], would like to admit pt to their hospice unit. She can be reached at [phone] would need H/P and discharge summary sent to [phone]. [phone] Pt rates pain 9 out of 10 that is generalized, requested PRN Morphine, wanted IV Morphine through [facility], will have to talk to [address] [facility] report to [facility] [address] at [address] . Spoke with Michell, RN  -Per Diam[facility]ILLA, okay to transport on hi-claire O2, pt refuses to be turned could not check skin integrity on backside, will assess during transfer    1045 Daughter at bedside, pt scheduled to be picked up at 1100. [place] completed. Pt's daughter had already taken pts belongings home, was given pts phone . 1147 Per Diamante and , pt okay to transport from 35L Hi-flow to 50% on VM, respiratory at bedside     Shift Summary- Shift uneventful. Pt rested throughout shift, was given PRN Morphine for generalized pain. All questions were answered.

## 2017-10-11 NOTE — PROGRESS NOTES
D/c plan: Palliative care unit at [facility]. Telephone call from George Benítez [employer]. States she can accomodate patient today. The accepting physician is Dr. Max Garza. Patient   Will be transported to [address], [address] palliative care unit    RN trasnportation has been arranged for 11am by [place]    RN please call report to [phone]. Ext [phone]    Please include all hard scripts for controlled substances, med rec and dc summary in packet. Please medicate for pain prior to dc if possible and needed to help offset delay when patient first arrives to facility. Telephone call with Dr. Suggs Police continue with transport. Patient will be going to Palliative care unit. Family requested this transfer the daughter. CM has tried numerous attempts to inform daughter of time of transfer. Left message three times. Telephone call from Megan Nava RN informed cm that daughter is on her way up to patients room now    Care Management Interventions  PCP Verified by CM: Yes  Mode of Transport at Discharge: [facility]  Transition of Care Consult (CM Consult):  Other (palliative care unit at [place] )  Current Support Network: Lives Alone  Confirm Follow Up Transport: Other (see comment)  Plan discussed with Pt/Family/Caregiver: Yes  Freedom of Choice Offered: Yes  Discharge Location  [place]palliate care unit)

## 2017-10-12 LAB
ABO + RH BLD: NORMAL
BLD PROD TYP BPU: NORMAL
BLOOD GROUP ANTIBODIES SERPL: NORMAL
BPU ID: NORMAL
CALLED TO:,BCALL1: NORMAL
CROSSMATCH RESULT,%XM: NORMAL
SPECIMEN EXP DATE BLD: NORMAL
STATUS OF UNIT,%ST: NORMAL
UNIT DIVISION, %UDIV: 0

## 2017-10-15 LAB
ATRIAL RATE: 98 BPM
BACTERIA SPEC CULT: NORMAL
CALCULATED P AXIS, ECG09: 62 DEGREES
CALCULATED R AXIS, ECG10: -38 DEGREES
CALCULATED T AXIS, ECG11: 51 DEGREES
DIAGNOSIS, 93000: NORMAL
P-R INTERVAL, ECG05: 132 MS
Q-T INTERVAL, ECG07: 404 MS
QRS DURATION, ECG06: 76 MS
QTC CALCULATION (BEZET), ECG08: 515 MS
SERVICE CMNT-IMP: NORMAL
VENTRICULAR RATE, ECG03: 98 BPM

## 2019-09-21 NOTE — ED NOTES
Patient wife is calling to request  to called in the pharmacy to prescribe her  an oral suspension that will help him he is in urgent need of Nystatin Oral Suspension.  Dr. Adames know he has parkinson and because of the medication he is currently on he has an Oral Thrush and has a lot of discomfort .    Wife ask to please call in this medication she is going through a lot with her  and feels so over Whelmed. Please reach out to her if this is possible TODAY.   TRANSFER - OUT REPORT:    Verbal report given to [facility] RN on Xiomara Storey  being transferred to telemetry for routine progression of care       Report consisted of patients Situation, Background, Assessment and   Recommendations(SBAR). Information from the following report(s) SBAR, ED Summary, MAR, Recent Results and Cardiac Rhythm sinus tachycardia was reviewed with the receiving nurse. Lines:   Venous Access Device 10/09/17 Upper chest (subclavicular area, right (Active)   Central Line Being Utilized Yes 10/9/2017  1:20 PM   Site Assessment Clean, dry, & intact 10/9/2017  1:20 PM   Date Accessed (Medial Site) 10/09/17 10/9/2017  1:20 PM   Access Time (Medial Site) 1300 10/9/2017  1:20 PM   Access Needle Length (Medial Site) 0.75 inches 10/9/2017  1:20 PM   Positive Blood Return (Medial Site) Yes 10/9/2017  1:20 PM   Action Taken (Medial Site) Blood drawn;Flushed 10/9/2017  1:20 PM        Opportunity for questions and clarification was provided.       Patient transported with:   Monitor  O2 @ 3 liters  Tech